# Patient Record
Sex: FEMALE | Race: WHITE | NOT HISPANIC OR LATINO
[De-identification: names, ages, dates, MRNs, and addresses within clinical notes are randomized per-mention and may not be internally consistent; named-entity substitution may affect disease eponyms.]

---

## 2017-05-28 ENCOUNTER — HOSPITAL (OUTPATIENT)
Dept: OTHER | Age: 77
End: 2017-05-28
Attending: FAMILY MEDICINE

## 2018-07-23 ENCOUNTER — HOSPITAL ENCOUNTER (OUTPATIENT)
Dept: ULTRASOUND IMAGING | Age: 78
Discharge: HOME | End: 2018-07-23
Attending: SPECIALIST
Payer: MEDICARE

## 2018-07-23 DIAGNOSIS — C67.9 MALIGNANT NEOPLASM OF URINARY BLADDER, UNSPECIFIED SITE (HCC): ICD-10-CM

## 2018-07-23 PROCEDURE — 76775 US EXAM ABDO BACK WALL LIM: CPT | Performed by: SPECIALIST

## 2018-08-21 ENCOUNTER — LAB ENCOUNTER (OUTPATIENT)
Dept: LAB | Age: 78
End: 2018-08-21
Attending: SPECIALIST
Payer: MEDICARE

## 2018-08-21 DIAGNOSIS — C18.2 COLON CANCER, ASCENDING (HCC): Primary | ICD-10-CM

## 2018-08-21 LAB — CEA SERPL-MCNC: 1.6 NG/ML (ref 0.5–5)

## 2018-08-21 PROCEDURE — 82378 CARCINOEMBRYONIC ANTIGEN: CPT

## 2018-08-21 PROCEDURE — 36415 COLL VENOUS BLD VENIPUNCTURE: CPT

## 2019-08-30 ENCOUNTER — LAB ENCOUNTER (OUTPATIENT)
Dept: LAB | Age: 79
End: 2019-08-30
Attending: SPECIALIST
Payer: MEDICARE

## 2019-08-30 DIAGNOSIS — C67.9 BLADDER CANCER (HCC): Primary | ICD-10-CM

## 2019-08-30 LAB
ANION GAP SERPL CALC-SCNC: 4 MMOL/L (ref 0–18)
BASOPHILS # BLD AUTO: 0.03 X10(3) UL (ref 0–0.2)
BASOPHILS NFR BLD AUTO: 0.5 %
BUN BLD-MCNC: 11 MG/DL (ref 7–18)
BUN/CREAT SERPL: 12.9 (ref 10–20)
CALCIUM BLD-MCNC: 9 MG/DL (ref 8.5–10.1)
CHLORIDE SERPL-SCNC: 106 MMOL/L (ref 98–112)
CO2 SERPL-SCNC: 31 MMOL/L (ref 21–32)
CREAT BLD-MCNC: 0.85 MG/DL (ref 0.55–1.02)
DEPRECATED RDW RBC AUTO: 43.6 FL (ref 35.1–46.3)
EOSINOPHIL # BLD AUTO: 0.06 X10(3) UL (ref 0–0.7)
EOSINOPHIL NFR BLD AUTO: 1 %
ERYTHROCYTE [DISTWIDTH] IN BLOOD BY AUTOMATED COUNT: 13.4 % (ref 11–15)
GLUCOSE BLD-MCNC: 83 MG/DL (ref 70–99)
HCT VFR BLD AUTO: 42 % (ref 35–48)
HGB BLD-MCNC: 13.9 G/DL (ref 12–16)
IMM GRANULOCYTES # BLD AUTO: 0.02 X10(3) UL (ref 0–1)
IMM GRANULOCYTES NFR BLD: 0.3 %
LYMPHOCYTES # BLD AUTO: 1.62 X10(3) UL (ref 1–4)
LYMPHOCYTES NFR BLD AUTO: 26.7 %
MCH RBC QN AUTO: 29.3 PG (ref 26–34)
MCHC RBC AUTO-ENTMCNC: 33.1 G/DL (ref 31–37)
MCV RBC AUTO: 88.4 FL (ref 80–100)
MONOCYTES # BLD AUTO: 0.43 X10(3) UL (ref 0.1–1)
MONOCYTES NFR BLD AUTO: 7.1 %
NEUTROPHILS # BLD AUTO: 3.9 X10 (3) UL (ref 1.5–7.7)
NEUTROPHILS # BLD AUTO: 3.9 X10(3) UL (ref 1.5–7.7)
NEUTROPHILS NFR BLD AUTO: 64.4 %
OSMOLALITY SERPL CALC.SUM OF ELEC: 291 MOSM/KG (ref 275–295)
PLATELET # BLD AUTO: 253 10(3)UL (ref 150–450)
POTASSIUM SERPL-SCNC: 3.9 MMOL/L (ref 3.5–5.1)
RBC # BLD AUTO: 4.75 X10(6)UL (ref 3.8–5.3)
SODIUM SERPL-SCNC: 141 MMOL/L (ref 136–145)
WBC # BLD AUTO: 6.1 X10(3) UL (ref 4–11)

## 2019-08-30 PROCEDURE — 80048 BASIC METABOLIC PNL TOTAL CA: CPT

## 2019-08-30 PROCEDURE — 85025 COMPLETE CBC W/AUTO DIFF WBC: CPT

## 2019-11-05 ENCOUNTER — LAB ENCOUNTER (OUTPATIENT)
Dept: LAB | Age: 79
End: 2019-11-05
Attending: SPECIALIST
Payer: MEDICARE

## 2019-11-05 DIAGNOSIS — C18.9 COLON CANCER (HCC): Primary | ICD-10-CM

## 2019-11-05 PROCEDURE — 80053 COMPREHEN METABOLIC PANEL: CPT

## 2019-11-05 PROCEDURE — 85025 COMPLETE CBC W/AUTO DIFF WBC: CPT

## 2019-11-05 PROCEDURE — 82378 CARCINOEMBRYONIC ANTIGEN: CPT

## 2019-12-10 ENCOUNTER — TELEPHONE (OUTPATIENT)
Dept: FAMILY MEDICINE CLINIC | Facility: CLINIC | Age: 79
End: 2019-12-10

## 2019-12-10 NOTE — LETTER
Alexandria Pace   [address] [place] [id number]           Dear Alexandria Pace     Our records indicate that you have outstanding lab work and or testing that was ordered for you and has not yet been completed:  Lab Frequency Next Occurrence   XR DEXA BONE DENSITOMETRY (CPT=77080) Once 08/31/2023   Basic Metabolic Panel (8) [E] Once 03/06/2024      To provide you with the best possible care, please complete these orders at your earliest convenience. If you have recently completed these orders please disregard this letter.     If you have any questions please call the office at [phone].     Thank you,     [facility]        absent

## 2019-12-24 NOTE — TELEPHONE ENCOUNTER
Left message on voicemail/answering machine for Marlene Samuel to call office regarding [facility] seeing her family member.

## 2020-01-30 NOTE — TELEPHONE ENCOUNTER
[facility] Nell Mcneill Nurse   Caller: Unspecified (Today, 11:09 AM)             TESSY FITZPATRICK IS THE DAUGHTER OF THIS PT.     ADV THAT ERIN AND DR WRIGHT NOT IN OFFICE TODAY!  WILL CALL BACK AND TRICIA Carlton

## 2020-07-16 ENCOUNTER — TELEPHONE (OUTPATIENT)
Dept: FAMILY MEDICINE CLINIC | Facility: CLINIC | Age: 80
End: 2020-07-16

## 2020-07-16 NOTE — TELEPHONE ENCOUNTER
This is the mother of Mirta Huerta [birth year]. She is now living with daughter. Will [facility] accept her as a NP? Also, if she will accept pt is needing to be seen for a right earache. Please call back.

## 2020-07-17 ENCOUNTER — OFFICE VISIT (OUTPATIENT)
Dept: FAMILY MEDICINE CLINIC | Facility: CLINIC | Age: 80
End: 2020-07-17
Payer: MEDICARE

## 2020-07-17 VITALS
OXYGEN SATURATION: 98 % | WEIGHT: 90.63 LBS | RESPIRATION RATE: 15 BRPM | HEART RATE: 110 BPM | TEMPERATURE: 99 F | DIASTOLIC BLOOD PRESSURE: 90 MMHG | SYSTOLIC BLOOD PRESSURE: 142 MMHG

## 2020-07-17 DIAGNOSIS — I10 ESSENTIAL HYPERTENSION: ICD-10-CM

## 2020-07-17 DIAGNOSIS — Z85.038 HISTORY OF COLON CANCER: ICD-10-CM

## 2020-07-17 DIAGNOSIS — C67.9 MALIGNANT NEOPLASM OF URINARY BLADDER, UNSPECIFIED SITE (HCC): ICD-10-CM

## 2020-07-17 DIAGNOSIS — H61.21 IMPACTED CERUMEN OF RIGHT EAR: Primary | ICD-10-CM

## 2020-07-17 PROCEDURE — 99203 OFFICE O/P NEW LOW 30 MIN: CPT | Performed by: FAMILY MEDICINE

## 2020-07-17 RX ORDER — THIAMINE HCL 100 MG
500 TABLET ORAL EVERY MORNING
COMMUNITY

## 2020-07-17 RX ORDER — AMLODIPINE BESYLATE 5 MG/1
5 TABLET ORAL DAILY
COMMUNITY
Start: 2020-05-19 | End: 2020-09-02

## 2020-07-17 NOTE — PROGRESS NOTES
Antolin Hicks is a 67year old female. HPI:   Patient referred by her dtr who is my patient, Iron Gamboa. She reports hx of R ear wax ipaction and she's HATED getting it removed, having her ears messed with gives her panic attacks.   She figures th clubbing or edema    ASSESSMENT AND PLAN:   Diagnoses and all orders for this visit:    Impacted cerumen of right ear  -removed with plastic scoop today, patient tolerated well; instructed on home debrox or hydrogen peroxide a week out of the month if she

## 2020-07-17 NOTE — TELEPHONE ENCOUNTER
Yes that's fine, thanks can do 4:30 today for quick ear visit (this will not be a long new patient estalish visit, just ear)

## 2020-07-24 ENCOUNTER — TELEPHONE (OUTPATIENT)
Dept: FAMILY MEDICINE CLINIC | Facility: CLINIC | Age: 80
End: 2020-07-24

## 2020-07-24 NOTE — TELEPHONE ENCOUNTER
Pt completed medical records request form, 07/17/20, for Dr. Gabi Liz to send pt's medical records from [phone], here to Dr. Leigha tatum. Will fax to Dr. Miguel Leggett office today. Med Rec form is in blue book up front.

## 2020-07-27 NOTE — TELEPHONE ENCOUNTER
Received via fax, medical records requested 7/24/20, medical records requested 7/24/20, from [facility], Dr. Shannon Sanchez. Pulled request from blue book up front, printed scan bar and gave everything to Dr. Telly Burrell.

## 2020-07-28 ENCOUNTER — MED REC SCAN ONLY (OUTPATIENT)
Dept: FAMILY MEDICINE CLINIC | Facility: CLINIC | Age: 80
End: 2020-07-28

## 2020-08-20 ENCOUNTER — TELEPHONE (OUTPATIENT)
Dept: FAMILY MEDICINE CLINIC | Facility: CLINIC | Age: 80
End: 2020-08-20

## 2020-08-20 DIAGNOSIS — I10 ESSENTIAL HYPERTENSION: Primary | ICD-10-CM

## 2020-08-20 DIAGNOSIS — Z85.038 HISTORY OF COLON CANCER: ICD-10-CM

## 2020-08-20 NOTE — TELEPHONE ENCOUNTER
Pt called, pt needs order CEA blood work and pt also wanted to know have we received information/records from Dr. Kristian Spencer from [facility]?   Please call pt at [phone]

## 2020-08-20 NOTE — TELEPHONE ENCOUNTER
She last had the CEA last nov, so can wait a few months (once a year fine). Order placed, thanks    Not sure on the records. I'll check my desk when I back in the office.

## 2020-09-02 RX ORDER — AMLODIPINE BESYLATE 5 MG/1
5 TABLET ORAL DAILY
Qty: 90 TABLET | Refills: 0 | Status: SHIPPED | OUTPATIENT
Start: 2020-09-02 | End: 2020-12-01

## 2020-09-02 NOTE — TELEPHONE ENCOUNTER
Pt called, needs refill on amLODIPine Besylate 5 MG Oral Tab. [facility].    Please call pt at [phone]

## 2020-09-02 NOTE — TELEPHONE ENCOUNTER
LRF- we have not filled this yet for this pt    LOV  7/17/2020  No future appointments.       BP Readings from Last 3 Encounters:  07/17/20 : 142/90        [address] there was a note a couple weeks ago about records from Dr. Talia Purcell and there are rcords scanned i

## 2020-11-24 ENCOUNTER — LAB ENCOUNTER (OUTPATIENT)
Dept: LAB | Age: 80
End: 2020-11-24
Attending: FAMILY MEDICINE
Payer: MEDICARE

## 2020-11-24 DIAGNOSIS — Z85.038 HISTORY OF COLON CANCER: ICD-10-CM

## 2020-11-24 PROCEDURE — 82378 CARCINOEMBRYONIC ANTIGEN: CPT

## 2020-11-24 PROCEDURE — 36415 COLL VENOUS BLD VENIPUNCTURE: CPT

## 2020-12-01 RX ORDER — AMLODIPINE BESYLATE 5 MG/1
5 TABLET ORAL DAILY
Qty: 90 TABLET | Refills: 1 | Status: SHIPPED | OUTPATIENT
Start: 2020-12-01 | End: 2021-02-26

## 2020-12-01 NOTE — TELEPHONE ENCOUNTER
Pt failed refill protocol for the following reasons:      Hypertension Medications Protocol Utthwo7612/01/2020 09:56 AM   CMP or BMP in past 12 months         Last refill: 9- #90 with 0 refills  Last appt: 7-  Next appt: No future appointments.

## 2020-12-03 ENCOUNTER — TELEPHONE (OUTPATIENT)
Dept: FAMILY MEDICINE CLINIC | Facility: CLINIC | Age: 80
End: 2020-12-03

## 2021-02-22 ENCOUNTER — TELEPHONE (OUTPATIENT)
Dept: FAMILY MEDICINE CLINIC | Facility: CLINIC | Age: 81
End: 2021-02-22

## 2021-02-22 NOTE — TELEPHONE ENCOUNTER
Patient advised, she has a physical therapy appointment this afternoon, her daughter drives her, she will coordinate with her and call back.

## 2021-02-22 NOTE — TELEPHONE ENCOUNTER
Pt called she is wanting to know if the dr could send in a muscle relaxer and tylenol with codeine for back pain?        Pt would like sent to   [place] [address], [address] [facility] [phone], [phone]

## 2021-02-22 NOTE — TELEPHONE ENCOUNTER
C/O pain in the left mid back. Denies injury, has had the pain for a couple of weeks saw napropathist/ chiropractor in [facility]. He did some manipulation, didn't really help. Has been seeing him for about 20 years. Some days better than others.  She is delmy

## 2021-02-24 ENCOUNTER — TELEPHONE (OUTPATIENT)
Dept: FAMILY MEDICINE CLINIC | Facility: CLINIC | Age: 81
End: 2021-02-24

## 2021-02-24 ENCOUNTER — MED REC SCAN ONLY (OUTPATIENT)
Dept: FAMILY MEDICINE CLINIC | Facility: CLINIC | Age: 81
End: 2021-02-24

## 2021-02-24 NOTE — TELEPHONE ENCOUNTER
Pt called, is suppose to start therapy on Tuesday and she wanted muscle relaxors and pain killers before she goes. No 30 min slots available. Please call pt at [phone].    Pt only has certain times she can come and we have nothing available at those t

## 2021-02-24 NOTE — TELEPHONE ENCOUNTER
Per , patient is only available after 4pm  Wants appt before next Tuesday    Routed to [facility] to advise

## 2021-02-26 ENCOUNTER — OFFICE VISIT (OUTPATIENT)
Dept: FAMILY MEDICINE CLINIC | Facility: CLINIC | Age: 81
End: 2021-02-26
Payer: MEDICARE

## 2021-02-26 VITALS
WEIGHT: 100.38 LBS | SYSTOLIC BLOOD PRESSURE: 142 MMHG | DIASTOLIC BLOOD PRESSURE: 86 MMHG | OXYGEN SATURATION: 97 % | RESPIRATION RATE: 20 BRPM | TEMPERATURE: 99 F | HEART RATE: 96 BPM

## 2021-02-26 DIAGNOSIS — M54.9 UPPER BACK PAIN: Primary | ICD-10-CM

## 2021-02-26 DIAGNOSIS — R29.898 NECK TIGHTNESS: ICD-10-CM

## 2021-02-26 DIAGNOSIS — I10 ESSENTIAL HYPERTENSION: ICD-10-CM

## 2021-02-26 PROCEDURE — 99214 OFFICE O/P EST MOD 30 MIN: CPT | Performed by: FAMILY MEDICINE

## 2021-02-26 RX ORDER — AMLODIPINE BESYLATE 5 MG/1
5 TABLET ORAL DAILY
Qty: 90 TABLET | Refills: 1 | Status: SHIPPED | OUTPATIENT
Start: 2021-02-26 | End: 2022-01-12

## 2021-02-26 NOTE — PROGRESS NOTES
Rachana Soriano is a 79year old female. HPI:   Patient signed herself up for PHYSICAL THERAPY but also wants to talk medicine options.       She gets occassional backaches for which she'll see her chiro periodically (they do more deep massage type work) REVIEW OF SYSTEMS:   GENERAL HEALTH: feels well otherwise  See HPI    EXAM:   /86   Pulse 96   Temp 98.7 °F (37.1 °C) (Temporal)   Resp 20   Wt 100 lb 6 oz (45.5 kg)   SpO2 97%   GENERAL: well developed, well nourished,in no apparent distress

## 2021-03-02 ENCOUNTER — TELEPHONE (OUTPATIENT)
Dept: FAMILY MEDICINE CLINIC | Facility: CLINIC | Age: 81
End: 2021-03-02

## 2021-03-02 ENCOUNTER — MED REC SCAN ONLY (OUTPATIENT)
Dept: FAMILY MEDICINE CLINIC | Facility: CLINIC | Age: 81
End: 2021-03-02

## 2021-03-05 DIAGNOSIS — Z23 NEED FOR VACCINATION: ICD-10-CM

## 2021-04-06 ENCOUNTER — TELEPHONE (OUTPATIENT)
Dept: FAMILY MEDICINE CLINIC | Facility: CLINIC | Age: 81
End: 2021-04-06

## 2021-04-07 ENCOUNTER — MED REC SCAN ONLY (OUTPATIENT)
Dept: FAMILY MEDICINE CLINIC | Facility: CLINIC | Age: 81
End: 2021-04-07

## 2021-04-23 ENCOUNTER — TELEPHONE (OUTPATIENT)
Dept: FAMILY MEDICINE CLINIC | Facility: CLINIC | Age: 81
End: 2021-04-23

## 2021-04-23 NOTE — TELEPHONE ENCOUNTER
Left message for pt to call office to schedule 1st Medicare visit-Welcome to Medicare.    Waiting pt call back

## 2021-05-26 ENCOUNTER — PATIENT OUTREACH (OUTPATIENT)
Dept: FAMILY MEDICINE CLINIC | Facility: CLINIC | Age: 81
End: 2021-05-26

## 2021-06-02 ENCOUNTER — OFFICE VISIT (OUTPATIENT)
Dept: FAMILY MEDICINE CLINIC | Facility: CLINIC | Age: 81
End: 2021-06-02
Payer: MEDICARE

## 2021-06-02 VITALS
OXYGEN SATURATION: 99 % | SYSTOLIC BLOOD PRESSURE: 140 MMHG | WEIGHT: 101.63 LBS | TEMPERATURE: 98 F | HEIGHT: 56 IN | BODY MASS INDEX: 22.86 KG/M2 | DIASTOLIC BLOOD PRESSURE: 82 MMHG | HEART RATE: 90 BPM

## 2021-06-02 DIAGNOSIS — M81.0 AGE-RELATED OSTEOPOROSIS WITHOUT CURRENT PATHOLOGICAL FRACTURE: ICD-10-CM

## 2021-06-02 DIAGNOSIS — Z78.0 POSTMENOPAUSAL ESTROGEN DEFICIENCY: ICD-10-CM

## 2021-06-02 DIAGNOSIS — C67.9 MALIGNANT NEOPLASM OF URINARY BLADDER, UNSPECIFIED SITE (HCC): ICD-10-CM

## 2021-06-02 DIAGNOSIS — I10 ESSENTIAL HYPERTENSION: ICD-10-CM

## 2021-06-02 DIAGNOSIS — Z00.00 ENCOUNTER FOR ANNUAL HEALTH EXAMINATION: Primary | ICD-10-CM

## 2021-06-02 DIAGNOSIS — Z85.038 HISTORY OF COLON CANCER: ICD-10-CM

## 2021-06-02 PROCEDURE — G0439 PPPS, SUBSEQ VISIT: HCPCS | Performed by: FAMILY MEDICINE

## 2021-06-02 NOTE — PROGRESS NOTES
HPI:   Jory Medina is a 79year old female who presents for a Medicare Subsequent Annual Wellness visit (Pt already had Initial Annual Wellness). Patient thinks she has good overall health.   PHYSICAL THERAPY at [facility] went well, feeling much b NOT have a Power of  for [employer] on file in [place]. The patient has this document but we do not have it in [facility], and patient is instructed to get our office a copy of it for scanning into Epic.        She has never smoked tobacco.    CAGE screen file.   SOCIAL HISTORY:   She  reports that she has never smoked. She has never used smokeless tobacco. She reports that she does not drink alcohol and does not use drugs.      REVIEW OF SYSTEMS:   GENERAL: feels well otherwise  Has some itching bi lower le symmetric radial and post tibial   Skin: Skin color, texture, turgor normal, no rashes or lesions; sebaceous cyst mid upper back I express some contents from; bi inner lower ankles mildly red/rough skin   Lymph nodes: Cervical, supraclavicular, and axillar Future  -     CBC WITH DIFFERENTIAL WITH PLATELET; Future  -     COMP METABOLIC PANEL (14); Future  -     LIPID PANEL; Future  -     TSH W REFLEX TO FREE T4; Future  -     VITAMIN D, 25-HYDROXY; Future  -     CEA;  Future    Age-related osteoporosis without Panel  Cholesterol  Lipoprotein (HDL)  Triglycerides Covered every 5 years for all Medicare beneficiaries without apparent signs or symptoms of cardiovascular disease No results found for: CHOLEST, HDL, LDL, LDLD, LDLC, TRIG      Electrocardiogram (EKG) with certain risk factors   -  No recommendations at this time    Tetanus Toxoid Not covered by Medicare Part B unless medically necessary (cut with metal); may be covered with your pharmacy prescription benefits -    Tetanus, Diptheria and Pertusis TD and

## 2021-06-02 NOTE — PATIENT INSTRUCTIONS
Alexandria Pace's SCREENING SCHEDULE   Tests on this list are recommended by your physician but may not be covered, or covered at this frequency, by your insurer. Please check with your insurance carrier before scheduling to verify coverage.    PREVENT Annually if at high risk -  No recommendations at this time    Chlamydia Annually if high risk -  No recommendations at this time   Screening Mammogram    Mammogram     Recommend annually for all female patients aged 36 and older    One baseline mammogram

## 2021-06-03 ENCOUNTER — HOSPITAL ENCOUNTER (OUTPATIENT)
Dept: BONE DENSITY | Age: 81
Discharge: HOME | End: 2021-06-03
Attending: FAMILY MEDICINE
Payer: MEDICARE

## 2021-06-03 DIAGNOSIS — Z78.0 POSTMENOPAUSAL ESTROGEN DEFICIENCY: ICD-10-CM

## 2021-06-03 PROCEDURE — 77080 DXA BONE DENSITY AXIAL: CPT | Performed by: FAMILY MEDICINE

## 2021-07-14 ENCOUNTER — TELEPHONE (OUTPATIENT)
Dept: FAMILY MEDICINE CLINIC | Facility: CLINIC | Age: 81
End: 2021-07-14

## 2021-07-14 RX ORDER — POLYETHYLENE GLYCOL 3350, SODIUM SULFATE ANHYDROUS, SODIUM BICARBONATE, SODIUM CHLORIDE, POTASSIUM CHLORIDE 236; 22.74; 6.74; 5.86; 2.97 G/4L; G/4L; G/4L; G/4L; G/4L
420 POWDER, FOR SOLUTION ORAL ONCE
COMMUNITY
Start: 2021-07-06

## 2021-07-14 RX ORDER — CIPROFLOXACIN 500 MG/1
TABLET, FILM COATED ORAL
COMMUNITY
Start: 2020-06-16

## 2021-07-14 NOTE — TELEPHONE ENCOUNTER
Pt is here asking for a prescription for flavored Go-Lytly. The one that was prescribed was unflavored. Please send to the [facility] in [facility]. Please call pt with questions.

## 2021-07-14 NOTE — TELEPHONE ENCOUNTER
Patient is having a colonoscopy and was advised to call and get medicine from them like she did initially. Patient v/u.

## 2021-09-09 ENCOUNTER — LAB ENCOUNTER (OUTPATIENT)
Dept: LAB | Age: 81
End: 2021-09-09
Attending: FAMILY MEDICINE
Payer: MEDICARE

## 2021-09-09 DIAGNOSIS — I10 ESSENTIAL HYPERTENSION: ICD-10-CM

## 2021-09-09 DIAGNOSIS — Z85.038 HISTORY OF COLON CANCER: ICD-10-CM

## 2021-09-09 DIAGNOSIS — Z00.00 ENCOUNTER FOR ANNUAL HEALTH EXAMINATION: ICD-10-CM

## 2021-09-09 DIAGNOSIS — Z78.0 POSTMENOPAUSAL ESTROGEN DEFICIENCY: ICD-10-CM

## 2021-09-09 DIAGNOSIS — M81.0 AGE-RELATED OSTEOPOROSIS WITHOUT CURRENT PATHOLOGICAL FRACTURE: ICD-10-CM

## 2021-09-09 DIAGNOSIS — C67.9 MALIGNANT NEOPLASM OF URINARY BLADDER, UNSPECIFIED SITE (HCC): ICD-10-CM

## 2021-09-09 LAB
ALBUMIN SERPL-MCNC: 3.6 G/DL (ref 3.4–5)
ALBUMIN/GLOB SERPL: 1 {RATIO} (ref 1–2)
ALP LIVER SERPL-CCNC: 77 U/L
ALT SERPL-CCNC: 17 U/L
ANION GAP SERPL CALC-SCNC: 4 MMOL/L (ref 0–18)
AST SERPL-CCNC: 11 U/L (ref 15–37)
BASOPHILS # BLD AUTO: 0.03 X10(3) UL (ref 0–0.2)
BASOPHILS NFR BLD AUTO: 0.6 %
BILIRUB SERPL-MCNC: 0.5 MG/DL (ref 0.1–2)
BUN BLD-MCNC: 13 MG/DL (ref 7–18)
CALCIUM BLD-MCNC: 9 MG/DL (ref 8.5–10.1)
CEA SERPL-MCNC: 1.7 NG/ML (ref ?–5)
CHLORIDE SERPL-SCNC: 108 MMOL/L (ref 98–112)
CHOLEST SMN-MCNC: 216 MG/DL (ref ?–200)
CO2 SERPL-SCNC: 28 MMOL/L (ref 21–32)
CREAT BLD-MCNC: 0.85 MG/DL
EOSINOPHIL # BLD AUTO: 0.15 X10(3) UL (ref 0–0.7)
EOSINOPHIL NFR BLD AUTO: 3 %
ERYTHROCYTE [DISTWIDTH] IN BLOOD BY AUTOMATED COUNT: 14.7 %
GLOBULIN PLAS-MCNC: 3.5 G/DL (ref 2.8–4.4)
GLUCOSE BLD-MCNC: 84 MG/DL (ref 70–99)
HCT VFR BLD AUTO: 42.3 %
HDLC SERPL-MCNC: 62 MG/DL (ref 40–59)
HGB BLD-MCNC: 13.3 G/DL
IMM GRANULOCYTES # BLD AUTO: 0.01 X10(3) UL (ref 0–1)
IMM GRANULOCYTES NFR BLD: 0.2 %
LDLC SERPL CALC-MCNC: 132 MG/DL (ref ?–100)
LYMPHOCYTES # BLD AUTO: 1.19 X10(3) UL (ref 1–4)
LYMPHOCYTES NFR BLD AUTO: 23.5 %
M PROTEIN MFR SERPL ELPH: 7.1 G/DL (ref 6.4–8.2)
MCH RBC QN AUTO: 28.5 PG (ref 26–34)
MCHC RBC AUTO-ENTMCNC: 31.4 G/DL (ref 31–37)
MCV RBC AUTO: 90.8 FL
MONOCYTES # BLD AUTO: 0.51 X10(3) UL (ref 0.1–1)
MONOCYTES NFR BLD AUTO: 10.1 %
NEUTROPHILS # BLD AUTO: 3.18 X10 (3) UL (ref 1.5–7.7)
NEUTROPHILS # BLD AUTO: 3.18 X10(3) UL (ref 1.5–7.7)
NEUTROPHILS NFR BLD AUTO: 62.6 %
NONHDLC SERPL-MCNC: 154 MG/DL (ref ?–130)
OSMOLALITY SERPL CALC.SUM OF ELEC: 289 MOSM/KG (ref 275–295)
PATIENT FASTING Y/N/NP: YES
PATIENT FASTING Y/N/NP: YES
PLATELET # BLD AUTO: 250 10(3)UL (ref 150–450)
POTASSIUM SERPL-SCNC: 3.7 MMOL/L (ref 3.5–5.1)
RBC # BLD AUTO: 4.66 X10(6)UL
SODIUM SERPL-SCNC: 140 MMOL/L (ref 136–145)
TRIGL SERPL-MCNC: 124 MG/DL (ref 30–149)
TSI SER-ACNC: 1.19 MIU/ML (ref 0.36–3.74)
VIT D+METAB SERPL-MCNC: 47.2 NG/ML (ref 30–100)
VLDLC SERPL CALC-MCNC: 22 MG/DL (ref 0–30)
WBC # BLD AUTO: 5.1 X10(3) UL (ref 4–11)

## 2021-09-09 PROCEDURE — 80053 COMPREHEN METABOLIC PANEL: CPT

## 2021-09-09 PROCEDURE — 80061 LIPID PANEL: CPT

## 2021-09-09 PROCEDURE — 36415 COLL VENOUS BLD VENIPUNCTURE: CPT

## 2021-09-09 PROCEDURE — 85025 COMPLETE CBC W/AUTO DIFF WBC: CPT

## 2021-09-09 PROCEDURE — 84443 ASSAY THYROID STIM HORMONE: CPT

## 2021-09-09 PROCEDURE — 82306 VITAMIN D 25 HYDROXY: CPT

## 2021-09-09 PROCEDURE — 82378 CARCINOEMBRYONIC ANTIGEN: CPT

## 2021-09-17 ENCOUNTER — ORDER TRANSCRIPTION (OUTPATIENT)
Dept: ADMINISTRATIVE | Facility: HOSPITAL | Age: 81
End: 2021-09-17

## 2021-09-17 ENCOUNTER — LAB ENCOUNTER (OUTPATIENT)
Dept: LAB | Age: 81
End: 2021-09-17
Payer: MEDICARE

## 2021-09-17 DIAGNOSIS — Z01.818 PREOP EXAMINATION: ICD-10-CM

## 2021-09-17 DIAGNOSIS — Z11.59 ENCOUNTER FOR SCREENING FOR OTHER VIRAL DISEASES: ICD-10-CM

## 2021-09-17 DIAGNOSIS — Z01.818 PREOP EXAMINATION: Primary | ICD-10-CM

## 2021-09-18 LAB — SARS-COV-2 RNA RESP QL NAA+PROBE: NOT DETECTED

## 2021-10-05 ENCOUNTER — MED REC SCAN ONLY (OUTPATIENT)
Dept: FAMILY MEDICINE CLINIC | Facility: CLINIC | Age: 81
End: 2021-10-05

## 2022-01-11 NOTE — TELEPHONE ENCOUNTER
Last refilled 2/26/21 for #90 with 1 RF  LOV with [facility] 6/2/21  No future appt with pcp  Last CMP 9/9/21  Routed to pcp to advise    Hypertension Medications Protocol Failed 01/11/2022 08:34 AM   Protocol Details  Appointment in past 6 or next 3 months    CMP

## 2022-01-12 RX ORDER — AMLODIPINE BESYLATE 5 MG/1
5 TABLET ORAL DAILY
Qty: 90 TABLET | Refills: 1 | Status: SHIPPED | OUTPATIENT
Start: 2022-01-12

## 2022-04-05 ENCOUNTER — TELEPHONE (OUTPATIENT)
Dept: FAMILY MEDICINE CLINIC | Facility: CLINIC | Age: 82
End: 2022-04-05

## 2022-06-13 ENCOUNTER — OFFICE VISIT (OUTPATIENT)
Dept: FAMILY MEDICINE CLINIC | Facility: CLINIC | Age: 82
End: 2022-06-13
Payer: MEDICARE

## 2022-06-13 VITALS
BODY MASS INDEX: 23.4 KG/M2 | TEMPERATURE: 98 F | WEIGHT: 107 LBS | DIASTOLIC BLOOD PRESSURE: 86 MMHG | HEART RATE: 120 BPM | RESPIRATION RATE: 16 BRPM | OXYGEN SATURATION: 100 % | SYSTOLIC BLOOD PRESSURE: 132 MMHG | HEIGHT: 56.5 IN

## 2022-06-13 DIAGNOSIS — Z00.00 ENCOUNTER FOR ANNUAL HEALTH EXAMINATION: Primary | ICD-10-CM

## 2022-06-13 DIAGNOSIS — M81.0 AGE-RELATED OSTEOPOROSIS WITHOUT CURRENT PATHOLOGICAL FRACTURE: ICD-10-CM

## 2022-06-13 DIAGNOSIS — I10 ESSENTIAL HYPERTENSION: ICD-10-CM

## 2022-06-13 DIAGNOSIS — C67.9 MALIGNANT NEOPLASM OF URINARY BLADDER, UNSPECIFIED SITE (HCC): ICD-10-CM

## 2022-06-13 DIAGNOSIS — L81.9 HYPERPIGMENTED SKIN LESION: ICD-10-CM

## 2022-06-13 DIAGNOSIS — Z85.038 HISTORY OF COLON CANCER: ICD-10-CM

## 2022-06-13 PROCEDURE — G0439 PPPS, SUBSEQ VISIT: HCPCS | Performed by: FAMILY MEDICINE

## 2022-06-13 PROCEDURE — 1125F AMNT PAIN NOTED PAIN PRSNT: CPT | Performed by: FAMILY MEDICINE

## 2022-07-29 ENCOUNTER — MED REC SCAN ONLY (OUTPATIENT)
Dept: FAMILY MEDICINE CLINIC | Facility: CLINIC | Age: 82
End: 2022-07-29

## 2022-08-04 RX ORDER — AMLODIPINE BESYLATE 5 MG/1
TABLET ORAL
Qty: 90 TABLET | Refills: 0 | Status: SHIPPED | OUTPATIENT
Start: 2022-08-04

## 2022-08-04 NOTE — TELEPHONE ENCOUNTER
LOV 06/13/2022  Last labs 09/09/2021  BP Readings from Last 3 Encounters:  06/13/22 : 132/86  06/02/21 : 140/82  02/26/21 : 142/86      Last refill on 01/12/22, for #90 tabs, with 1 refills  amLODIPine 5 MG Oral Tab    Hypertension Medications Protocol Passed 08/04/2022 11:11 AM   Protocol Details  CMP or BMP in past 12 months    Last serum creatinine< 2.0    Appointment in past 6 or next 3 months         No future appointments.     Order per protocol

## 2022-09-08 ENCOUNTER — TELEPHONE (OUTPATIENT)
Dept: FAMILY MEDICINE CLINIC | Facility: CLINIC | Age: 82
End: 2022-09-08

## 2022-09-08 DIAGNOSIS — Z85.038 HISTORY OF COLON CANCER: Primary | ICD-10-CM

## 2022-09-08 NOTE — TELEPHONE ENCOUNTER
Order placed    Left detailed message to voicemail (per verbal release form consent with confirmed identifying message) of note below/above. Patient was advised to call CS# to schedule lab test and/or call office back with any questions/concerns.

## 2022-09-08 NOTE — TELEPHONE ENCOUNTER
PATIENT SCHEDULED HERSELF FOR LAB DRAW AT [facility]. PATIENT WANTING A CEA. PATIENT WAS UNDER THE ASSUMPTION THE ORDER WOULD BE THERE AUTOMATICALLY. [facility] CALL[facility]. PATIENT WAS SENT HOME. DR Minda Flowers HAD WRITTEN THIS ORDER PREVIOUSLY.

## 2022-09-08 NOTE — TELEPHONE ENCOUNTER
LOV 06/13/2022 with Dr. Jojo Obrien for annual health exam    Last CEA lab completed 09/09/2021 - ordered by Dr. Edel Reyes to place CEA lab order? Diagnosis?   Please advise, thank you

## 2022-09-09 ENCOUNTER — LAB ENCOUNTER (OUTPATIENT)
Dept: LAB | Age: 82
End: 2022-09-09
Attending: FAMILY MEDICINE
Payer: MEDICARE

## 2022-09-09 DIAGNOSIS — Z85.038 HISTORY OF COLON CANCER: ICD-10-CM

## 2022-09-09 LAB — CEA SERPL-MCNC: 1.6 NG/ML (ref ?–5)

## 2022-09-09 PROCEDURE — 82378 CARCINOEMBRYONIC ANTIGEN: CPT

## 2022-09-09 PROCEDURE — 36415 COLL VENOUS BLD VENIPUNCTURE: CPT

## 2022-12-27 RX ORDER — AMLODIPINE BESYLATE 5 MG/1
TABLET ORAL
Qty: 90 TABLET | Refills: 0 | Status: SHIPPED | OUTPATIENT
Start: 2022-12-27

## 2022-12-27 NOTE — TELEPHONE ENCOUNTER
Hypertension Medications Protocol Failed 12/27/2022 10:16 AM   Protocol Details  CMP or BMP in past 12 months    Appointment in past 6 or next 3 months    Last serum creatinine< 2.0     LOV: 6/13/22   Last Refill: 8/4/22 90 0 Rf    No future appointments.

## 2023-04-05 ENCOUNTER — MED REC SCAN ONLY (OUTPATIENT)
Dept: FAMILY MEDICINE CLINIC | Facility: CLINIC | Age: 83
End: 2023-04-05

## 2023-05-08 RX ORDER — AMLODIPINE BESYLATE 5 MG/1
TABLET ORAL
Qty: 90 TABLET | Refills: 0 | Status: SHIPPED | OUTPATIENT
Start: 2023-05-08

## 2023-05-08 NOTE — TELEPHONE ENCOUNTER
LOV 06/13/22  BP Readings from Last 3 Encounters:  06/13/22 : 132/86  06/02/21 : 140/82  02/26/21 : 142/86    Last refill on 12/27/22, for #90 tabs, with 0 refills  AMLODIPINE 5 MG Oral Tab  Hypertension Medications Protocol Failed 05/08/2023 10:22 AM   Protocol Details  CMP or BMP in past 12 months    Last serum creatinine< 2.0    Appointment in past 6 or next 3 months     Future Appointments   Date Time Provider Remi Pitt   6/21/2023  1:20 PM Howie Pollard MD [facility] HEIKE Gao     Order(s) pending, please review. Thank you.

## 2023-08-31 ENCOUNTER — OFFICE VISIT (OUTPATIENT)
Dept: FAMILY MEDICINE CLINIC | Facility: CLINIC | Age: 83
End: 2023-08-31
Payer: MEDICARE

## 2023-08-31 VITALS
SYSTOLIC BLOOD PRESSURE: 146 MMHG | DIASTOLIC BLOOD PRESSURE: 80 MMHG | HEIGHT: 55 IN | BODY MASS INDEX: 23.37 KG/M2 | WEIGHT: 101 LBS | TEMPERATURE: 98 F | HEART RATE: 110 BPM | OXYGEN SATURATION: 100 %

## 2023-08-31 DIAGNOSIS — I10 ESSENTIAL HYPERTENSION: ICD-10-CM

## 2023-08-31 DIAGNOSIS — Z85.51 HISTORY OF PRIMARY MALIGNANT NEOPLASM OF URINARY BLADDER: ICD-10-CM

## 2023-08-31 DIAGNOSIS — Z85.038 HISTORY OF COLON CANCER: ICD-10-CM

## 2023-08-31 DIAGNOSIS — M81.0 AGE-RELATED OSTEOPOROSIS WITHOUT CURRENT PATHOLOGICAL FRACTURE: ICD-10-CM

## 2023-08-31 DIAGNOSIS — Z00.00 ENCOUNTER FOR ANNUAL HEALTH EXAMINATION: Primary | ICD-10-CM

## 2023-08-31 PROBLEM — C67.9 MALIGNANT NEOPLASM OF URINARY BLADDER (HCC): Status: RESOLVED | Noted: 2020-07-17 | Resolved: 2023-08-31

## 2023-08-31 LAB
ALBUMIN SERPL-MCNC: 3.9 G/DL (ref 3.4–5)
ALBUMIN/GLOB SERPL: 1.1 {RATIO} (ref 1–2)
ALP LIVER SERPL-CCNC: 99 U/L
ALT SERPL-CCNC: 22 U/L
ANION GAP SERPL CALC-SCNC: 3 MMOL/L (ref 0–18)
AST SERPL-CCNC: 16 U/L (ref 15–37)
BASOPHILS # BLD AUTO: 0.03 X10(3) UL (ref 0–0.2)
BASOPHILS NFR BLD AUTO: 0.4 %
BILIRUB SERPL-MCNC: 0.7 MG/DL (ref 0.1–2)
BUN BLD-MCNC: 16 MG/DL (ref 7–18)
CALCIUM BLD-MCNC: 9.1 MG/DL (ref 8.5–10.1)
CEA SERPL-MCNC: 1.9 NG/ML (ref ?–5)
CHLORIDE SERPL-SCNC: 107 MMOL/L (ref 98–112)
CHOLEST SERPL-MCNC: 222 MG/DL (ref ?–200)
CO2 SERPL-SCNC: 28 MMOL/L (ref 21–32)
CREAT BLD-MCNC: 1.01 MG/DL
EGFRCR SERPLBLD CKD-EPI 2021: 56 ML/MIN/1.73M2 (ref 60–?)
EOSINOPHIL # BLD AUTO: 0.05 X10(3) UL (ref 0–0.7)
EOSINOPHIL NFR BLD AUTO: 0.7 %
ERYTHROCYTE [DISTWIDTH] IN BLOOD BY AUTOMATED COUNT: 14.3 %
FASTING PATIENT LIPID ANSWER: NO
FASTING STATUS PATIENT QL REPORTED: NO
GLOBULIN PLAS-MCNC: 3.7 G/DL (ref 2.8–4.4)
GLUCOSE BLD-MCNC: 89 MG/DL (ref 70–99)
HCT VFR BLD AUTO: 43.1 %
HDLC SERPL-MCNC: 70 MG/DL (ref 40–59)
HGB BLD-MCNC: 14.2 G/DL
IMM GRANULOCYTES # BLD AUTO: 0.03 X10(3) UL (ref 0–1)
IMM GRANULOCYTES NFR BLD: 0.4 %
LDLC SERPL CALC-MCNC: 129 MG/DL (ref ?–100)
LYMPHOCYTES # BLD AUTO: 1.38 X10(3) UL (ref 1–4)
LYMPHOCYTES NFR BLD AUTO: 19.4 %
MCH RBC QN AUTO: 29.1 PG (ref 26–34)
MCHC RBC AUTO-ENTMCNC: 32.9 G/DL (ref 31–37)
MCV RBC AUTO: 88.3 FL
MONOCYTES # BLD AUTO: 0.47 X10(3) UL (ref 0.1–1)
MONOCYTES NFR BLD AUTO: 6.6 %
NEUTROPHILS # BLD AUTO: 5.17 X10 (3) UL (ref 1.5–7.7)
NEUTROPHILS # BLD AUTO: 5.17 X10(3) UL (ref 1.5–7.7)
NEUTROPHILS NFR BLD AUTO: 72.5 %
NONHDLC SERPL-MCNC: 152 MG/DL (ref ?–130)
OSMOLALITY SERPL CALC.SUM OF ELEC: 287 MOSM/KG (ref 275–295)
PLATELET # BLD AUTO: 260 10(3)UL (ref 150–450)
POTASSIUM SERPL-SCNC: 4.1 MMOL/L (ref 3.5–5.1)
PROT SERPL-MCNC: 7.6 G/DL (ref 6.4–8.2)
RBC # BLD AUTO: 4.88 X10(6)UL
SODIUM SERPL-SCNC: 138 MMOL/L (ref 136–145)
TRIGL SERPL-MCNC: 129 MG/DL (ref 30–149)
TSI SER-ACNC: 1.74 MIU/ML (ref 0.36–3.74)
VLDLC SERPL CALC-MCNC: 23 MG/DL (ref 0–30)
WBC # BLD AUTO: 7.1 X10(3) UL (ref 4–11)

## 2023-08-31 PROCEDURE — G0439 PPPS, SUBSEQ VISIT: HCPCS | Performed by: NURSE PRACTITIONER

## 2023-08-31 PROCEDURE — 85025 COMPLETE CBC W/AUTO DIFF WBC: CPT | Performed by: NURSE PRACTITIONER

## 2023-08-31 PROCEDURE — 80061 LIPID PANEL: CPT | Performed by: NURSE PRACTITIONER

## 2023-08-31 PROCEDURE — 84443 ASSAY THYROID STIM HORMONE: CPT | Performed by: NURSE PRACTITIONER

## 2023-08-31 PROCEDURE — 1126F AMNT PAIN NOTED NONE PRSNT: CPT | Performed by: NURSE PRACTITIONER

## 2023-08-31 PROCEDURE — 80053 COMPREHEN METABOLIC PANEL: CPT | Performed by: NURSE PRACTITIONER

## 2023-08-31 PROCEDURE — 82378 CARCINOEMBRYONIC ANTIGEN: CPT | Performed by: NURSE PRACTITIONER

## 2023-09-01 ENCOUNTER — TELEPHONE (OUTPATIENT)
Dept: FAMILY MEDICINE CLINIC | Facility: CLINIC | Age: 83
End: 2023-09-01

## 2023-09-01 DIAGNOSIS — R94.4 DECREASED GFR: Primary | ICD-10-CM

## 2023-09-01 NOTE — TELEPHONE ENCOUNTER
----- Message from MAGGIE Morris sent at 9/1/2023  8:41 AM CDT -----  Chemistries stable, mildly decreased GFR. Continue to monitor, recheck BMP in 6 months  Cholesterol mildly increased but stable from last year. Could consider medication if interested but definitely continue lifestyle modifications. This incorporating nuts, high-fiber foods and fiber supplements, and foods rich in polyphenols (berries, apples, spinach, olives, and flaxseed), which can help reduce LDL and increase HDL.    No anemia  Thyroid function normal

## 2023-09-06 NOTE — TELEPHONE ENCOUNTER
Pt calling - asking what GFR and BMP is    Advised pt - GFR - estimated glomerular filtration rate   blood test that looks for changes in how your kidneys function     Pt asking what can affect this - advised pt per clinical references: Your test results may be affected if you:  Have changing kidney function   Are severely malnourished, are underweight, or have muscle-wasting disease  Are severely overweight  Are a   Have a neuromuscular disorder  Are taking certain medicines, including chemotherapies and kidney medicines  Eat a lot of meat  Eat a vegetarian or low-meat diet  Take creatine supplements  Are pregnant  Have health conditions that affect the kidneys\      Advised pt BMP is basic metabolic panel - includes kidney function in test - she v/u    Pt asking if she is taking too many supplements? Advised pt that APRN stated \"mildly decreased\" - no mention of concerns to advise stopping any supplements - plan to recheck in 6 months for improvement or not - she v/u    Pt asking CEA number - advised pt CEA 1.9  Pt v/u.  No further questions at this time    Recall placed - BMP in 6 months  Order placed

## 2023-09-07 RX ORDER — AMLODIPINE BESYLATE 5 MG/1
TABLET ORAL
Qty: 90 TABLET | Refills: 0 | Status: SHIPPED | OUTPATIENT
Start: 2023-09-07

## 2023-09-07 NOTE — TELEPHONE ENCOUNTER
Hypertension Medications Protocol Soayga0809/07/2023 11:00 AM   Protocol Details CMP or BMP in past 12 months    Last serum creatinine< 2.0    Appointment in past 6 or next 3 months     LOV 8/31/23    No future appointments.     Last refill 5/8/23 90 days with 0 refills     Last lab 8/31/23    Medication past protocol    Medication sent

## 2023-11-15 PROBLEM — Z85.51 H/O PRIMARY MALIGNANT NEOPLASM OF URINARY BLADDER: Status: ACTIVE | Noted: 2023-11-15

## 2023-11-16 ENCOUNTER — MED REC SCAN ONLY (OUTPATIENT)
Dept: FAMILY MEDICINE CLINIC | Facility: CLINIC | Age: 83
End: 2023-11-16

## 2024-01-17 RX ORDER — AMLODIPINE BESYLATE 5 MG/1
TABLET ORAL
Qty: 90 TABLET | Refills: 0 | Status: SHIPPED | OUTPATIENT
Start: 2024-01-17

## 2024-01-17 NOTE — TELEPHONE ENCOUNTER
Hypertension Medications Protocol Ukkhnz6601/16/2024 04:36 PM   Protocol Details CMP or BMP in past 12 months    Last serum creatinine< 2.0    Appointment in past 6 or next 3 months

## 2024-03-15 ENCOUNTER — HOSPITAL ENCOUNTER (OUTPATIENT)
Dept: GENERAL RADIOLOGY | Age: 84
Discharge: HOME | End: 2024-03-15
Attending: NURSE PRACTITIONER
Payer: MEDICARE

## 2024-03-15 ENCOUNTER — OFFICE VISIT (OUTPATIENT)
Dept: FAMILY MEDICINE CLINIC | Facility: CLINIC | Age: 84
End: 2024-03-15
Payer: MEDICARE

## 2024-03-15 VITALS
HEIGHT: 55 IN | OXYGEN SATURATION: 98 % | BODY MASS INDEX: 23.14 KG/M2 | HEART RATE: 95 BPM | DIASTOLIC BLOOD PRESSURE: 80 MMHG | SYSTOLIC BLOOD PRESSURE: 116 MMHG | RESPIRATION RATE: 18 BRPM | TEMPERATURE: 97 F | WEIGHT: 100 LBS

## 2024-03-15 DIAGNOSIS — M79.644 PAIN OF RIGHT THUMB: ICD-10-CM

## 2024-03-15 DIAGNOSIS — M79.644 PAIN OF RIGHT THUMB: Primary | ICD-10-CM

## 2024-03-15 PROCEDURE — 73140 X-RAY EXAM OF FINGER(S): CPT | Performed by: NURSE PRACTITIONER

## 2024-03-15 PROCEDURE — G2211 COMPLEX E/M VISIT ADD ON: HCPCS | Performed by: NURSE PRACTITIONER

## 2024-03-15 PROCEDURE — 99213 OFFICE O/P EST LOW 20 MIN: CPT | Performed by: NURSE PRACTITIONER

## 2024-03-15 NOTE — PROGRESS NOTES
HPI:     Patient is here for right thumb pain. Started about 5 weeks ago after organizing her closet. Has history of trigger finger in another finger. No major injury but more overuse. Has tried occasional aspirin. Stiffness in the morning. Slowly improving. Would an xray today. Would also like to go to PT/OT    Current Outpatient Medications   Medication Sig Dispense Refill    AMLODIPINE 5 MG Oral Tab Take 1 tablet by mouth once daily 90 tablet 0    Multiple Vitamin (MULTI-VITAMIN) Oral Tab Take 1 tablet by mouth every morning.      Cyanocobalamin (VITAMIN B-12) 2500 MCG Sublingual SL Tab Take 500 mcg by mouth every morning.      LUTEIN OR Take 1 tablet by mouth every morning.      Multiple Vitamins-Minerals (HAIR SKIN AND NAILS FORMULA OR) Take 1 tablet by mouth every morning.      Cranberry 1000 MG Oral Cap Take 1 tablet by mouth every morning.        Past Medical History:   Diagnosis Date    Arthritis     Bladder cancer (HCC)     Colon cancer (HCC)     Essential hypertension     Malignant neoplasm of urinary bladder (HCC) 07/17/2020 2012, 2013, 2014, 2019 (treated with bcg bladder infusions)-urologist Dr. Meng [place]      Past Surgical History:   Procedure Laterality Date    HERNIA SURGERY      OTHER SURGICAL HISTORY      colectomy      History reviewed. No pertinent family history.   Social History     Socioeconomic History    Marital status:    Tobacco Use    Smoking status: Never    Smokeless tobacco: Never   Vaping Use    Vaping Use: Never used   Substance and Sexual Activity    Alcohol use: Never    Drug use: Never         REVIEW OF SYSTEMS:   Review of Systems   Musculoskeletal:         Finger pain, swelling   Skin:  Negative for rash.       EXAM:   /80   Pulse 95   Temp 97.4 °F (36.3 °C) (Temporal)   Resp 18   Ht 4' 7\" (1.397 m)   Wt 100 lb (45.4 kg)   SpO2 98%   BMI 23.24 kg/m²   Physical Exam  Constitutional:       General: She is not in acute distress.      Appearance: Normal appearance.   Musculoskeletal:        Arms:    Skin:     General: Skin is warm and dry.   Neurological:      Mental Status: She is alert.         ASSESSMENT AND PLAN:   Diagnoses and all orders for this visit:    Pain of right thumb  -     XR FINGER(S) (MIN 2 VIEWS), RIGHT THUMB (CPT=73140); Future  -     Physical Therapy Referral - External    Xray today  Start PT and will evaluate if OT needed  Can continue PRN NSAID  Also ice

## 2024-03-16 ENCOUNTER — TELEPHONE (OUTPATIENT)
Dept: FAMILY MEDICINE CLINIC | Facility: CLINIC | Age: 84
End: 2024-03-16

## 2024-03-16 NOTE — TELEPHONE ENCOUNTER
----- Message from MAGGIE Bragg sent at 3/15/2024  4:42 PM CDT -----  Evidence of osteopenia, which isn't surprising since Dexa in 2021 showed osteoporosis. She does have Dexa on file, should consider repeating if she would be interested in treatment  No acute fractures or dislocation. Start PT  If pain persists, would recommend hand ortho specialist

## 2024-03-22 ENCOUNTER — TELEPHONE (OUTPATIENT)
Dept: FAMILY MEDICINE CLINIC | Facility: CLINIC | Age: 84
End: 2024-03-22

## 2024-03-22 DIAGNOSIS — M79.644 PAIN OF RIGHT THUMB: Primary | ICD-10-CM

## 2024-03-22 NOTE — TELEPHONE ENCOUNTER
Received call [facility] - needs order for occupation therapy vs PT  The wrist and hand specialist only does occupational therapy - okay to change order?  Fax: [phone]

## 2024-04-08 ENCOUNTER — TELEPHONE (OUTPATIENT)
Dept: FAMILY MEDICINE CLINIC | Facility: CLINIC | Age: 84
End: 2024-04-08

## 2024-04-08 ENCOUNTER — OFFICE VISIT (OUTPATIENT)
Dept: FAMILY MEDICINE CLINIC | Facility: CLINIC | Age: 84
End: 2024-04-08
Payer: MEDICARE

## 2024-04-08 VITALS
BODY MASS INDEX: 24 KG/M2 | TEMPERATURE: 97 F | HEART RATE: 77 BPM | SYSTOLIC BLOOD PRESSURE: 142 MMHG | RESPIRATION RATE: 18 BRPM | OXYGEN SATURATION: 99 % | WEIGHT: 105 LBS | DIASTOLIC BLOOD PRESSURE: 88 MMHG

## 2024-04-08 DIAGNOSIS — M79.89 SWELLING OF RIGHT HAND: Primary | ICD-10-CM

## 2024-04-08 PROCEDURE — 99213 OFFICE O/P EST LOW 20 MIN: CPT | Performed by: FAMILY MEDICINE

## 2024-04-08 RX ORDER — PREDNISONE 20 MG/1
50 TABLET ORAL DAILY
Qty: 13 TABLET | Refills: 0 | Status: SHIPPED | OUTPATIENT
Start: 2024-04-08 | End: 2024-04-13

## 2024-04-08 NOTE — PROGRESS NOTES
Alexandria Pace is a 83 year old female.   Chief Complaint   Patient presents with    Swelling     Right hand      HPI:    83-year-old female who comes in secondary to having swelling on her right hand.  Patient states that since January she has been working with physical therapy for her right hand secondary to having strength and movement issues.  Patient has been using a special brace as well has been doing physical therapy for her hand.  Patient noticed that this morning her hand was slightly swollen for which she comes in to have it evaluated.  Patient denies any new trauma falls patient has not taken any new medications either.  Past Medical History:   Diagnosis Date    Arthritis     Bladder cancer (HCC)     Colon cancer (HCC)     Essential hypertension     Malignant neoplasm of urinary bladder (HCC) 07/17/2020 2012, 2013, 2014, 2019 (treated with bcg bladder infusions)-urologist Dr. Meng Stephens [place]     Past Surgical History:   Procedure Laterality Date    HERNIA SURGERY      OTHER SURGICAL HISTORY      colectomy     No family history on file.  Social History:  Social History     Socioeconomic History    Marital status:    Tobacco Use    Smoking status: Never    Smokeless tobacco: Never   Vaping Use    Vaping Use: Never used   Substance and Sexual Activity    Alcohol use: Never    Drug use: Never     Allergies:  Allergies   Allergen Reactions    Fluorouracil UNKNOWN    Lorazepam UNKNOWN    Sulfamethoxazole W/Trimethoprim UNKNOWN    Erythromycin OTHER (SEE COMMENTS)    Lactose OTHER (SEE COMMENTS)     Intolerant      Current Meds:  Current Outpatient Medications   Medication Sig Dispense Refill    Turmeric (QC TUMERIC COMPLEX OR) Take by mouth.      predniSONE 20 MG Oral Tab Take 2.5 tablets (50 mg total) by mouth daily for 5 days. 13 tablet 0    AMLODIPINE 5 MG Oral Tab Take 1 tablet by mouth once daily 90 tablet 0    Multiple Vitamin (MULTI-VITAMIN) Oral Tab Take 1 tablet by mouth every  morning.      Cyanocobalamin (VITAMIN B-12) 2500 MCG Sublingual SL Tab Take 500 mcg by mouth every morning.      LUTEIN OR Take 1 tablet by mouth every morning.      Cranberry 1000 MG Oral Cap Take 1 tablet by mouth every morning.      Multiple Vitamins-Minerals (HAIR SKIN AND NAILS FORMULA OR) Take 1 tablet by mouth every morning. (Patient not taking: Reported on 4/8/2024)          ROS:   GENERAL HEALTH: feels well otherwise  SKIN: denies any unusual skin lesions or rashes  RESPIRATORY: denies shortness of breath with exertion  CARDIOVASCULAR: denies chest pain on exertion  GI: denies abdominal pain and denies heartburn  NEURO: denies headaches    PHYSICAL EXAM:   /88 (BP Location: Left arm, Patient Position: Sitting, Cuff Size: adult)   Pulse 77   Temp 97.3 °F (36.3 °C) (Temporal)   Resp 18   Wt 105 lb (47.6 kg)   SpO2 99%   BMI 24.40 kg/m²   GENERAL HEALTH: well developed, well nourished, in no apparent distress  EYES: sclera anicteric, conjunctiva normal  HEENT: normocephalic; normal pharynx  NECK: supple; no JVD, no LAD  RESPIRATORY: clear to auscultation bilaterally, no tachypnea  CARDIOVASCULAR: S1, S2 normal, no S3, no S4; no click; no murmur  EXTREMITIES: Right hand slight edema on the dorsum with significantly decreased strength   PSYCHIATRIC: alert and oriented x 3; affect appropriate      ASSESSMENT/ PLAN:     Diagnoses and all orders for this visit:    Swelling of right hand  -     XR HAND (MIN 3 VIEWS), RIGHT (CPT=73130); Future    Other orders  -     predniSONE 20 MG Oral Tab; Take 2.5 tablets (50 mg total) by mouth daily for 5 days.    Swelling and decrease in strength is pretty obvious on examination.  Will get rid of the current inflammatory issue with prednisone daily for the next 5 days and to continue with her current therapy.  May consider an x-ray if swelling does not get better with a prednisone.  Any further follow-up with her PCP in [place]    The patient is to return to office  in prn  The patient is to return to office for persistent or worsening signs and symptoms.   The proper use of medication and possible side effects discussed with patient.  An AVS was given to patient.  The patient verbalized understanding, agrees to treatment regimen and all questions were answered.

## 2024-04-08 NOTE — TELEPHONE ENCOUNTER
Lab Frequency Next Occurrence   XR DEXA BONE DENSITOMETRY (CPT=77080) Once 08/31/2023   Basic Metabolic Panel (8) [E] Once 03/06/2024     Letter mailed to patient reminding them they have outstanding orders.

## 2024-07-09 ENCOUNTER — PATIENT OUTREACH (OUTPATIENT)
Dept: FAMILY MEDICINE CLINIC | Facility: CLINIC | Age: 84
End: 2024-07-09

## 2024-09-04 NOTE — TELEPHONE ENCOUNTER
Hypertension Medications Protocol Hcflej5609/04/2024 11:27 AM   Protocol Details CMP or BMP in past 12 months    Last BP reading less than 140/90    EGFRCR or GFRNAA > 50    In person appointment or virtual visit in the past 12 mos or appointment in next 3      Last office visit 4/8/24  Last refilled on 1/17/24 for # 90 with 0 refills  No future appointments.   Due for physical and labs    MLODIPINE 5MG TAB04/24/2024590 Melvi Dunbar, [facility] [id number] ...

## 2024-09-06 NOTE — TELEPHONE ENCOUNTER
Please schedule medicare annual  We can bridge medication if patient will be out prior to appointment

## 2024-09-07 RX ORDER — AMLODIPINE BESYLATE 5 MG/1
TABLET ORAL
Qty: 30 TABLET | Refills: 0 | Status: SHIPPED | OUTPATIENT
Start: 2024-09-07 | End: 2024-10-30

## 2024-09-07 NOTE — TELEPHONE ENCOUNTER
Medication Quantity Refills Start End   AMLODIPINE 5 MG Oral Tab 90 tablet 0 1/17/2024 --   Sig:   Take 1 tablet by mouth once daily       Last labs 08/31/2023  Last seen on 08/31/2023. /80  No future appointments.     Hypertension Medications Protocol Pvnkvo2609/07/2024 10:30 AM   Protocol Details CMP or BMP in past 12 months    Last BP reading less than 140/90    EGFRCR or GFRNAA > 50     Refill pended  Thank you.

## 2024-09-26 ENCOUNTER — PATIENT OUTREACH (OUTPATIENT)
Dept: FAMILY MEDICINE CLINIC | Facility: CLINIC | Age: 84
End: 2024-09-26

## 2024-10-17 ENCOUNTER — OFFICE VISIT (OUTPATIENT)
Dept: FAMILY MEDICINE CLINIC | Facility: CLINIC | Age: 84
End: 2024-10-17
Payer: MEDICARE

## 2024-10-17 VITALS
HEIGHT: 55 IN | TEMPERATURE: 97 F | DIASTOLIC BLOOD PRESSURE: 82 MMHG | HEART RATE: 106 BPM | RESPIRATION RATE: 18 BRPM | SYSTOLIC BLOOD PRESSURE: 138 MMHG | OXYGEN SATURATION: 99 % | BODY MASS INDEX: 23.65 KG/M2 | WEIGHT: 102.19 LBS

## 2024-10-17 DIAGNOSIS — Z85.51 HISTORY OF PRIMARY MALIGNANT NEOPLASM OF URINARY BLADDER: ICD-10-CM

## 2024-10-17 DIAGNOSIS — R94.4 DECREASED GFR: ICD-10-CM

## 2024-10-17 DIAGNOSIS — G47.00 INSOMNIA, UNSPECIFIED TYPE: ICD-10-CM

## 2024-10-17 DIAGNOSIS — Z28.21 VACCINATION DECLINED: ICD-10-CM

## 2024-10-17 DIAGNOSIS — M81.0 AGE-RELATED OSTEOPOROSIS WITHOUT CURRENT PATHOLOGICAL FRACTURE: ICD-10-CM

## 2024-10-17 DIAGNOSIS — Z00.00 ENCOUNTER FOR ANNUAL HEALTH EXAMINATION: Primary | ICD-10-CM

## 2024-10-17 DIAGNOSIS — I10 ESSENTIAL HYPERTENSION: ICD-10-CM

## 2024-10-17 DIAGNOSIS — Z85.038 HISTORY OF COLON CANCER: ICD-10-CM

## 2024-10-17 RX ORDER — CHLORHEXIDINE GLUCONATE ORAL RINSE 1.2 MG/ML
SOLUTION DENTAL
COMMUNITY
Start: 2024-04-23

## 2024-10-17 NOTE — PROGRESS NOTES
Subjective:   Alexandria Pace is a 84 year old female who presents for a Medicare Subsequent Annual Wellness visit (Pt already had Initial Annual Wellness) and scheduled follow up of multiple significant but stable problems.   Trouble sleeping. Admits trouble both falling asleep and staying asleep. Feels like could be stress related. She has never tried an over the counter sleep supplement with no relief. Admits watching TV immediately before going to sleep. Admits getting an average of 6 hours in total of sleep per night.       History/Other:   Fall Risk Assessment:   She has been screened for Falls and is low risk.      Cognitive Assessment:   She had a completely normal cognitive assessment - see flowsheet entries       Functional Ability/Status:   Alexandria Pace has some abnormal functions as listed below:  She has Hearing problems based on screening of functional status.She has Vision problems based on screening of functional status.       Depression Screening (PHQ):  PHQ-2 SCORE: 0  , done 10/17/2024       Advanced Directives:   She does have a Living Will but we do NOT have it on file in Epic.    She does have a POA but we do NOT have it on file in Epic.      Patient Active Problem List   Diagnosis    Essential hypertension    History of colon cancer    Age-related osteoporosis without current pathological fracture    H/O primary malignant neoplasm of urinary bladder    History of primary malignant neoplasm of urinary bladder    Insomnia    Decreased GFR     Allergies:  She is allergic to fluorouracil, lorazepam, sulfamethoxazole w/trimethoprim, erythromycin, and lactose.    Current Medications:  Outpatient Medications Marked as Taking for the 10/17/24 encounter (Office Visit) with Melvi Dorman APRN   Medication Sig    chlorhexidine gluconate 0.12 % Mouth/Throat Solution RINSE FOR 30 SECONDS AND SPIT, USE ONCE DAILY AFTER BRUSHING .(BRUSH INTO IRRITATED GUMS) NOTHING BY MOUTH FOR 30 MINUTES AFTER  USING    amLODIPine 5 MG Oral Tab Take 1 tablet by mouth once daily    Turmeric (QC TUMERIC COMPLEX OR) Take by mouth.    Multiple Vitamin (MULTI-VITAMIN) Oral Tab Take 1 tablet by mouth every morning.    Cyanocobalamin (VITAMIN B-12) 2500 MCG Sublingual SL Tab Take 500 mcg by mouth every morning.    LUTEIN OR Take 1 tablet by mouth every morning.    Cranberry 1000 MG Oral Cap Take 1 tablet by mouth every morning.       Medical History:  She  has a past medical history of Arthritis, Bladder cancer (HCC), Colon cancer (HCC), Essential hypertension, and Malignant neoplasm of urinary bladder (HCC) (07/17/2020).  Surgical History:  She  has a past surgical history that includes hernia surgery and other surgical history.   Family History:  Her family history is not on file.  Social History:  She  reports that she has never smoked. She has never used smokeless tobacco. She reports that she does not drink alcohol and does not use drugs.    Tobacco:  She has never smoked tobacco.    CAGE Alcohol Screen:   CAGE screening score of 0 on 10/17/2024, showing low risk of alcohol abuse.      Patient Care Team:  Sylwia Longoria MD as PCP - General (Family Medicine)    Review of Systems   Constitutional:  Negative for activity change, fatigue and fever.   HENT:  Negative for congestion, rhinorrhea and sore throat.    Respiratory:  Negative for chest tightness and shortness of breath.    Cardiovascular:  Negative for chest pain and palpitations.   Gastrointestinal:  Negative for abdominal pain, blood in stool, constipation, diarrhea, nausea and vomiting.   Neurological:  Negative for dizziness and light-headedness.       Objective:   Physical Exam  Constitutional:       General: She is not in acute distress.     Appearance: Normal appearance. She is normal weight. She is not ill-appearing.   HENT:      Head: Normocephalic and atraumatic.      Right Ear: Tympanic membrane, ear canal and external ear normal. There is impacted cerumen.       Left Ear: Tympanic membrane, ear canal and external ear normal. There is impacted cerumen.      Nose: Nose normal. No congestion or rhinorrhea.      Mouth/Throat:      Mouth: Mucous membranes are moist.      Pharynx: Oropharynx is clear. Posterior oropharyngeal erythema present.   Eyes:      General: No scleral icterus.        Right eye: No discharge.         Left eye: No discharge.      Conjunctiva/sclera: Conjunctivae normal.      Pupils: Pupils are equal, round, and reactive to light.   Cardiovascular:      Rate and Rhythm: Regular rhythm. Tachycardia present.      Pulses: Normal pulses.      Heart sounds: Normal heart sounds.   Pulmonary:      Effort: Pulmonary effort is normal. No respiratory distress.      Breath sounds: Normal breath sounds.   Skin:     General: Skin is warm.      Capillary Refill: Capillary refill takes less than 2 seconds.   Neurological:      General: No focal deficit present.      Mental Status: She is alert and oriented to person, place, and time.   Psychiatric:         Mood and Affect: Mood normal.         Behavior: Behavior normal.         Thought Content: Thought content normal.         Judgment: Judgment normal.         /82 (BP Location: Right arm, Patient Position: Sitting, Cuff Size: adult)   Pulse 106   Temp 97.3 °F (36.3 °C) (Temporal)   Resp 18   Ht 4' 7\" (1.397 m)   Wt 102 lb 3.2 oz (46.4 kg)   SpO2 99%   BMI 23.75 kg/m²  Estimated body mass index is 23.75 kg/m² as calculated from the following:    Height as of this encounter: 4' 7\" (1.397 m).    Weight as of this encounter: 102 lb 3.2 oz (46.4 kg).    Medicare Hearing Assessment:   Hearing Screening    Time taken: 10/17/2024 12:48 PM  Screening Method: Finger Rub  Finger Rub Result: Pass         Visual Acuity:   Right Eye Visual Acuity: Corrected Right Eye Chart Acuity: 20/50   Left Eye Visual Acuity: Corrected Left Eye Chart Acuity: 20/50   Both Eyes Visual Acuity: Corrected Both Eyes Chart Acuity: 20/50    Able To Tolerate Visual Acuity: Yes        Assessment & Plan:   Alexandria Pace is a 84 year old female who presents for a Medicare Assessment.     1. Encounter for annual health examination (Primary)  Comments:  Return for fasting labs  Orders:  -     TSH W Reflex To Free T4; Future; Expected date: 10/17/2024  -     Comp Metabolic Panel (14); Future; Expected date: 10/17/2024  -     Lipid Panel; Future; Expected date: 10/17/2024  -     CBC With Differential With Platelet; Future; Expected date: 10/17/2024  2. Essential hypertension  Comments:  BP is goal, continue current medication  Orders:  -     Comp Metabolic Panel (14); Future; Expected date: 10/17/2024  -     Lipid Panel; Future; Expected date: 10/17/2024  3. Decreased GFR  Comments:  will return for lab work  Overview:  will return for lab work  Orders:  -     Comp Metabolic Panel (14); Future; Expected date: 10/17/2024  4. Vaccination declined  5. Age-related osteoporosis without current pathological fracture  6. History of colon cancer  Comments:  checking annual CEA, will return for labs  Overview:  ROMULO--colonoscopy [id number]    Orders:  -     CEA; Future; Expected date: 10/17/2024  7. History of primary malignant neoplasm of urinary bladder  Comments:  Following with urology  8. Insomnia, unspecified type  Comments:  Discussed healthy sleep habits  The patient indicates understanding of these issues and agrees to the plan.  Lab work ordered.  Reinforced healthy diet, lifestyle, and exercise.      Return in 6 months (on 4/17/2025).     Melvi Dorman, MAGGIE, 10/17/2024     Supplementary Documentation:   General Health:  In the past six months, have you lost more than 10 pounds without trying?: 2 - No  Has your appetite been poor?: No  Type of Diet: Balanced  How does the patient maintain a good energy level?: Other (vitamins)  How would you describe your daily physical activity?: Moderate  How would you describe your current health state?: Good  How  do you maintain positive mental well-being?: Social Interaction;Visiting Family;Visiting Friends  On a scale of 0 to 10, with 0 being no pain and 10 being severe pain, what is your pain level?: 5 - (Moderate)  In the past six months, have you experienced urine leakage?: 1-Yes  At any time do you feel concerned for the safety/well-being of yourself and/or your children, in your home or elsewhere?: No  Have you had any immunizations at another office such as Influenza, Hepatitis B, Tetanus, or Pneumococcal?: No    Health Maintenance   Topic Date Due    Zoster Vaccines (1 of 2) Never done    Pneumococcal Vaccine: 65+ Years (1 of 1 - PCV) Never done    Fall Risk Screening (Annual)  01/01/2024    HTN: BP Follow-Up  05/08/2024    Annual Physical  08/31/2024    COVID-19 Vaccine (1 - 2023-24 season) Never done    Influenza Vaccine (1) Never done    DEXA Scan  Completed    Annual Depression Screening  Completed     Initial evaluation performed by CASTRO Luis    Patient discussed with student. Independent exam performed. Agree with assessment and plan.

## 2024-10-21 ENCOUNTER — LAB ENCOUNTER (OUTPATIENT)
Dept: LAB | Age: 84
End: 2024-10-21
Attending: NURSE PRACTITIONER
Payer: MEDICARE

## 2024-10-21 DIAGNOSIS — Z85.038 HISTORY OF COLON CANCER: ICD-10-CM

## 2024-10-21 DIAGNOSIS — Z00.00 ENCOUNTER FOR ANNUAL HEALTH EXAMINATION: ICD-10-CM

## 2024-10-21 DIAGNOSIS — R94.4 DECREASED GFR: ICD-10-CM

## 2024-10-21 DIAGNOSIS — I10 ESSENTIAL HYPERTENSION: ICD-10-CM

## 2024-10-21 LAB
ALBUMIN SERPL-MCNC: 4.8 G/DL (ref 3.2–4.8)
ALBUMIN/GLOB SERPL: 1.8 {RATIO} (ref 1–2)
ALP LIVER SERPL-CCNC: 97 U/L
ALT SERPL-CCNC: 23 U/L
ANION GAP SERPL CALC-SCNC: 6 MMOL/L (ref 0–18)
AST SERPL-CCNC: 27 U/L (ref ?–34)
BASOPHILS # BLD AUTO: 0.03 X10(3) UL (ref 0–0.2)
BASOPHILS NFR BLD AUTO: 0.5 %
BILIRUB SERPL-MCNC: 0.8 MG/DL (ref 0.2–1.1)
BUN BLD-MCNC: 17 MG/DL (ref 9–23)
CALCIUM BLD-MCNC: 10.1 MG/DL (ref 8.7–10.4)
CEA SERPL-MCNC: 1.4 NG/ML (ref ?–5)
CHLORIDE SERPL-SCNC: 108 MMOL/L (ref 98–112)
CHOLEST SERPL-MCNC: 218 MG/DL (ref ?–200)
CO2 SERPL-SCNC: 27 MMOL/L (ref 21–32)
CREAT BLD-MCNC: 1.05 MG/DL
EGFRCR SERPLBLD CKD-EPI 2021: 53 ML/MIN/1.73M2 (ref 60–?)
EOSINOPHIL # BLD AUTO: 0.11 X10(3) UL (ref 0–0.7)
EOSINOPHIL NFR BLD AUTO: 1.8 %
ERYTHROCYTE [DISTWIDTH] IN BLOOD BY AUTOMATED COUNT: 14.1 %
FASTING PATIENT LIPID ANSWER: YES
FASTING STATUS PATIENT QL REPORTED: YES
GLOBULIN PLAS-MCNC: 2.6 G/DL (ref 2–3.5)
GLUCOSE BLD-MCNC: 87 MG/DL (ref 70–99)
HCT VFR BLD AUTO: 42 %
HDLC SERPL-MCNC: 70 MG/DL (ref 40–59)
HGB BLD-MCNC: 14.3 G/DL
IMM GRANULOCYTES # BLD AUTO: 0.01 X10(3) UL (ref 0–1)
IMM GRANULOCYTES NFR BLD: 0.2 %
LDLC SERPL CALC-MCNC: 129 MG/DL (ref ?–100)
LYMPHOCYTES # BLD AUTO: 1.45 X10(3) UL (ref 1–4)
LYMPHOCYTES NFR BLD AUTO: 23.2 %
MCH RBC QN AUTO: 30 PG (ref 26–34)
MCHC RBC AUTO-ENTMCNC: 34 G/DL (ref 31–37)
MCV RBC AUTO: 88.2 FL
MONOCYTES # BLD AUTO: 0.47 X10(3) UL (ref 0.1–1)
MONOCYTES NFR BLD AUTO: 7.5 %
NEUTROPHILS # BLD AUTO: 4.19 X10 (3) UL (ref 1.5–7.7)
NEUTROPHILS # BLD AUTO: 4.19 X10(3) UL (ref 1.5–7.7)
NEUTROPHILS NFR BLD AUTO: 66.8 %
NONHDLC SERPL-MCNC: 148 MG/DL (ref ?–130)
OSMOLALITY SERPL CALC.SUM OF ELEC: 293 MOSM/KG (ref 275–295)
PLATELET # BLD AUTO: 235 10(3)UL (ref 150–450)
POTASSIUM SERPL-SCNC: 4.5 MMOL/L (ref 3.5–5.1)
PROT SERPL-MCNC: 7.4 G/DL (ref 5.7–8.2)
RBC # BLD AUTO: 4.76 X10(6)UL
SODIUM SERPL-SCNC: 141 MMOL/L (ref 136–145)
TRIGL SERPL-MCNC: 107 MG/DL (ref 30–149)
TSI SER-ACNC: 1.86 MIU/ML (ref 0.55–4.78)
VLDLC SERPL CALC-MCNC: 19 MG/DL (ref 0–30)
WBC # BLD AUTO: 6.3 X10(3) UL (ref 4–11)

## 2024-10-21 PROCEDURE — 82378 CARCINOEMBRYONIC ANTIGEN: CPT

## 2024-10-21 PROCEDURE — 84443 ASSAY THYROID STIM HORMONE: CPT

## 2024-10-21 PROCEDURE — 85025 COMPLETE CBC W/AUTO DIFF WBC: CPT

## 2024-10-21 PROCEDURE — 80053 COMPREHEN METABOLIC PANEL: CPT

## 2024-10-21 PROCEDURE — 36415 COLL VENOUS BLD VENIPUNCTURE: CPT

## 2024-10-21 PROCEDURE — 80061 LIPID PANEL: CPT

## 2024-10-23 ENCOUNTER — TELEPHONE (OUTPATIENT)
Dept: FAMILY MEDICINE CLINIC | Facility: CLINIC | Age: 84
End: 2024-10-23

## 2024-10-23 NOTE — TELEPHONE ENCOUNTER
----- Message from Melvi Dorman sent at 10/23/2024  7:12 AM CDT -----  Results reviewed.   Cholesterol stable but remains mildly elevated. Could consider statin but she has not wanted this in past  Chemistries stable  Thyroid function normal  CEA normal  No anemia

## 2024-10-30 RX ORDER — AMLODIPINE BESYLATE 5 MG/1
TABLET ORAL
Qty: 90 TABLET | Refills: 0 | Status: SHIPPED | OUTPATIENT
Start: 2024-10-30

## 2025-03-07 RX ORDER — AMLODIPINE BESYLATE 5 MG/1
TABLET ORAL
Qty: 90 TABLET | Refills: 0 | Status: SHIPPED | OUTPATIENT
Start: 2025-03-07

## 2025-03-07 NOTE — TELEPHONE ENCOUNTER
LOV: 10/17/24 for physical      AMLODIPINE 5 MG Oral Tab  Take 1 tablet by mouth once daily Dispense: 90 tablet, Refills: 0 ordered        10/30/2024          Hypertension Medications Protocol [id number]/07/2025 11:09 AM   Protocol Details CMP or BMP in past 12 months    Last BP reading less than 140/90    In person appointment or virtual visit in the past 12 mos or appointment in next 3 mos    EGFRCR or GFRNAA > 50    Medication is active on med list     No future appointments.

## 2025-04-21 ENCOUNTER — HOSPITAL ENCOUNTER (OUTPATIENT)
Age: 85
Discharge: HOME | End: 2025-04-21
Payer: MEDICARE

## 2025-04-21 VITALS
BODY MASS INDEX: 20.99 KG/M2 | SYSTOLIC BLOOD PRESSURE: 170 MMHG | HEIGHT: 58 IN | HEART RATE: 88 BPM | WEIGHT: 100 LBS | TEMPERATURE: 98 F | DIASTOLIC BLOOD PRESSURE: 70 MMHG | RESPIRATION RATE: 16 BRPM | OXYGEN SATURATION: 100 %

## 2025-04-21 DIAGNOSIS — L02.91 ABSCESS: Primary | ICD-10-CM

## 2025-04-21 PROCEDURE — 99203 OFFICE O/P NEW LOW 30 MIN: CPT | Performed by: PHYSICIAN ASSISTANT

## 2025-04-21 RX ORDER — CEPHALEXIN 500 MG/1
500 CAPSULE ORAL 4 TIMES DAILY
Qty: 28 CAPSULE | Refills: 0 | Status: SHIPPED | OUTPATIENT
Start: 2025-04-21 | End: 2025-04-28

## 2025-04-21 NOTE — ED PROVIDER NOTES
Patient Seen in: [facility]      History     Chief Complaint   Patient presents with    Cellulitis     Stated Complaint: neck irritation    Subjective:   The history is provided by the patient and a relative.       84-year-old female with past medical history of hypertension, bladder cancer, arthritis presents to the immediate care due to a red raised area on the base of her neck for the past several days.  Is known to always have a \" bump\" in the area.  Most likely some kind of cyst that she had seen her primary care doctor for in the past.  Never seem to bother her until recently.  Area was extremely itchy.  Now with some swelling and redness.  Minimal pain.  No drainage.  No fevers.  No history of skin infections.  Has been using hydrocortisone on the area to help with itching.  History of Present Illness               Objective:     Past Medical History:    Arthritis    Bladder cancer (HCC)    Colon cancer (HCC)    Essential hypertension    Malignant neoplasm of urinary bladder (HCC)    2012, 2013, 2014, 2019 (treated with bcg bladder infusions)-urologist Dr. Meng Stephens [place]              Past Surgical History:   Procedure Laterality Date    Hernia surgery      Other surgical history      colectomy                Social History     Socioeconomic History    Marital status:    Tobacco Use    Smoking status: Never    Smokeless tobacco: Never   Vaping Use    Vaping status: Never Used   Substance and Sexual Activity    Alcohol use: Never    Drug use: Never     Social Drivers of Health      Received from [facility]    Housing Stability              Review of Systems   Constitutional: Negative.    Respiratory: Negative.     Cardiovascular: Negative.    Skin:         abscess       Positive for stated complaint: neck irritation  Other systems are as noted in HPI.  Constitutional and vital signs reviewed.      All other systems reviewed and negative except as noted  above.                  Physical Exam     ED Triage Vitals [04/21/25 1528]   BP (!) 170/70   Pulse 88   Resp 16   Temp 98.1 °F (36.7 °C)   Temp src Oral   SpO2 100 %   O2 Device None (Room air)       Current Vitals:   Vital Signs  BP: (!) 170/70 (RN aware, and vitals taken sondra after 185/85 automatic.)  Pulse: 88  Resp: 16  Temp: 98.1 °F (36.7 °C)  Temp src: Oral    Oxygen Therapy  SpO2: 100 %  O2 Device: None (Room air)        Physical Exam  Vitals and nursing note reviewed.   Constitutional:       General: She is not in acute distress.  Pulmonary:      Effort: Pulmonary effort is normal.   Skin:     Comments: 3x3cm fluctuant abscess at the base of the neck. No surrounding erythema. Nontender,    Neurological:      General: No focal deficit present.      Mental Status: She is alert and oriented to person, place, and time.   Psychiatric:         Mood and Affect: Mood normal.         Behavior: Behavior normal.           Physical Exam                ED Course   Labs Reviewed - No data to display       Results                                 MDM   Ddx-sebaceous cyst, sebaceous cyst with infection, abscess    On exam the patient is afebrile nontoxic her vital signs are stable.  3 x 3 cm fluctuant abscess is noted at the base of the neck.  Overlying erythema no surrounding cellulitis.  Nontender.  Offered incision and drainage however patient declined.  Would prefer oral antibiotics as a trial first.  Will start on Keflex dermatology follow-up was given.  Reassured patient she can return to at any time to the immediate care for incision and drainage. Strict return precautions were discussed        Medical Decision Making  Problems Addressed:  Abscess: acute illness or injury    Risk  OTC drugs.  Prescription drug management.        Disposition and Plan     Clinical Impression:  1. Abscess         Disposition:  Discharge  4/21/2025  4:12 pm    Follow-up:  Sylwia Longoria MD  [address]  [id number]  [phone]          [facility] [address]  [facility] [address] [phone]              Medications Prescribed:  Discharge Medication List as of 4/21/2025  4:14 PM        START taking these medications    Details   cephALEXin 500 MG Oral Cap Take 1 capsule (500 mg total) by mouth 4 (four) times daily for 7 days., Normal, Disp-28 capsule, R-0             Supplementary Documentation:

## 2025-04-21 NOTE — DISCHARGE INSTRUCTIONS
Can attempt moist warm compresses  Keflex as directed until gone  Can use zyrtec as needed for itching  Follow-up with dermatology  If you change your mind about incision and drainage please report back to the immediate care.

## 2025-04-21 NOTE — ED INITIAL ASSESSMENT (HPI)
Pt with bump to the back of the neck. Redness noted. Started about 3 days ago. Was itchy and was using sponge in shower it wash and itch.

## 2025-04-28 ENCOUNTER — HOSPITAL ENCOUNTER (OUTPATIENT)
Age: 85
Discharge: HOME | End: 2025-04-28
Payer: MEDICARE

## 2025-04-28 VITALS
BODY MASS INDEX: 21 KG/M2 | WEIGHT: 100 LBS | OXYGEN SATURATION: 98 % | RESPIRATION RATE: 20 BRPM | HEART RATE: 110 BPM | TEMPERATURE: 98 F | SYSTOLIC BLOOD PRESSURE: 175 MMHG | DIASTOLIC BLOOD PRESSURE: 85 MMHG

## 2025-04-28 DIAGNOSIS — M43.6 TORTICOLLIS: ICD-10-CM

## 2025-04-28 DIAGNOSIS — L08.9 INFECTED CYST OF SKIN: Primary | ICD-10-CM

## 2025-04-28 DIAGNOSIS — L72.9 INFECTED CYST OF SKIN: Primary | ICD-10-CM

## 2025-04-28 PROCEDURE — 99214 OFFICE O/P EST MOD 30 MIN: CPT | Performed by: NURSE PRACTITIONER

## 2025-04-28 RX ORDER — CEPHALEXIN 500 MG/1
500 CAPSULE ORAL 4 TIMES DAILY
Qty: 12 CAPSULE | Refills: 0 | Status: SHIPPED | OUTPATIENT
Start: 2025-04-28 | End: 2025-05-01

## 2025-04-28 RX ORDER — ORPHENADRINE CITRATE 100 MG/1
100 TABLET ORAL 2 TIMES DAILY
Qty: 10 EACH | Refills: 0 | Status: SHIPPED | OUTPATIENT
Start: 2025-04-28 | End: 2025-05-03

## 2025-04-28 NOTE — ED INITIAL ASSESSMENT (HPI)
Patient has almost finished (1 pill remaining) of Keflex. She had a lot of redness, that is now diminished, but there is still a bump with white head to the back of the patient's distal neck/upper back. The bump is affecting her sleep and she now has muscle tightness to her the right shoulder area that is painful. She needs to schedule follow-up with derm still.

## 2025-04-28 NOTE — ED PROVIDER NOTES
Patient Seen in: [facility]      History     Chief Complaint   Patient presents with    Wound Recheck     Stated Complaint: Bump behind the neck; Right side of the neck is now stiff; Pain has moved    Subjective:   83 yo female presents to the immediate care with c/o neck pain.  Patient was seen here one week ago and treated with antibiotics for an infected cyst to the left posterior neck.  She has almost completed the antibiotics and wants to get it checked out.  She also complains of right sided neck pain and stiffness over the last 2-3 days.  She has been taking Aleve for this, but today it didn't help.  She states that she has not been able to sleep normally because of the cyst to her neck.  She has one pill of her antibiotics left, but the cyst continues to be large.  She denies any fever, chills, numbness, or tingling.     The history is provided by the patient.       History of Present Illness               Objective:     Past Medical History:    Arthritis    Bladder cancer (HCC)    Colon cancer (HCC)    Essential hypertension    Malignant neoplasm of urinary bladder (HCC)    2012, 2013, 2014, 2019 (treated with bcg bladder infusions)-urologist Dr. Meng Stephens [place]              Past Surgical History:   Procedure Laterality Date    Hernia surgery      Other surgical history      colectomy                Social History     Socioeconomic History    Marital status:    Tobacco Use    Smoking status: Never    Smokeless tobacco: Never   Vaping Use    Vaping status: Never Used   Substance and Sexual Activity    Alcohol use: Never    Drug use: Never     Social Drivers of Health      Received from [facility]    Housing Stability              Review of Systems   Constitutional: Negative.    Musculoskeletal:  Positive for myalgias and neck pain.   Skin:  Positive for wound.   All other systems reviewed and are negative.      Positive for stated complaint: Bump behind  the neck; Right side of the neck is now stiff; Pain has moved  Other systems are as noted in HPI.  Constitutional and vital signs reviewed.      All other systems reviewed and negative except as noted above.                  Physical Exam     ED Triage Vitals [04/28/25 1432]   BP (!) 175/85   Pulse 110   Resp 20   Temp 98.1 °F (36.7 °C)   Temp src Oral   SpO2 98 %   O2 Device None (Room air)       Current Vitals:   Vital Signs  BP: (!) 175/85  Pulse: 110  Resp: 20  Temp: 98.1 °F (36.7 °C)  Temp src: Oral    Oxygen Therapy  SpO2: 98 %  O2 Device: None (Room air)        Physical Exam  Vitals and nursing note reviewed.   Constitutional:       General: She is not in acute distress.     Appearance: Normal appearance. She is normal weight. She is not ill-appearing.   HENT:      Head: Normocephalic and atraumatic.      Nose: Nose normal.      Mouth/Throat:      Mouth: Mucous membranes are moist.      Pharynx: Oropharynx is clear.   Eyes:      Conjunctiva/sclera: Conjunctivae normal.      Pupils: Pupils are equal, round, and reactive to light.   Neck:      Comments: Tenderness with palpation of the right cervical paraspinal muscles. There is moderate spasm present.  ROM is limited with rotation due to spasm.  No vertebral point tenderness, crepitus, or step offs.  Motor strength and sensation intact.  Hand grasps are strong and equal bilaterally.    Cardiovascular:      Rate and Rhythm: Normal rate and regular rhythm.      Pulses: Normal pulses.      Heart sounds: Normal heart sounds.   Pulmonary:      Effort: Pulmonary effort is normal. No respiratory distress.      Breath sounds: Normal breath sounds.   Musculoskeletal:         General: Normal range of motion.      Cervical back: Normal range of motion and neck supple. Torticollis and tenderness present. Muscular tenderness present. No spinous process tenderness.   Skin:     General: Skin is warm and dry.      Comments: Large fluctuant cyst to the left posterior neck.   There is some fading ecchymosis and erythema to the area.  Area of eminent rupture is present.  With small amount of pressure, exudates were expressed.  Using manual expression a large amount of drainage and old cellular debris was removed.  Patient tolerated procedure well.     Neurological:      General: No focal deficit present.      Mental Status: She is alert and oriented to person, place, and time.   Psychiatric:         Mood and Affect: Mood normal.         Behavior: Behavior normal.         Physical Exam                ED Course   Labs Reviewed - No data to display     Results             MDM        Medical Decision Making  85 yo female with large cyst and torticollis.  Cyst was drained with manual expression.  Dressed by RN with gauze.      History and exam consistent with torticollis.  Do not feel that any imaging is necessary at this time.  Will plan to treat patient medically.  No evidence of fracture, acute cord compression, or radiculopathy.  Patient can take Ibuprofen and/or Tylenol for pain.  A muscle relaxer was prescribed for stiffness and soreness.  Discussed with patient caution with using the muscle relaxer as the sedative properties could put  her at a higher risk for falls.  Patient verbalized understanding and agreement.  Supportive management at home.  Follow up with PCP in 1 week as needed.    Risk  OTC drugs.  Prescription drug management.        Disposition and Plan     Clinical Impression:  1. Infected cyst of skin    2. Torticollis         Disposition:  Discharge  4/28/2025  3:23 pm    Follow-up:  Sylwia Longoria MD  [address] [id number] [phone]                Medications Prescribed:  Current Discharge Medication List        START taking these medications    Details   orphenadrine  MG Oral Tablet 12 Hr Take 100 mg by mouth 2 (two) times daily for 5 days.  Qty: 10 each, Refills: 0      !! cephALEXin 500 MG Oral Cap Take 1 capsule (500 mg total) by mouth 4  (four) times daily for 3 days.  Qty: 12 capsule, Refills: 0       !! - Potential duplicate medications found. Please discuss with provider.          Supplementary Documentation:

## 2025-04-28 NOTE — DISCHARGE INSTRUCTIONS
Rest and drink plenty of fluids.   Apply ice 20 minutes on and then 40 minutes off several times a day.  Alternate ice with warm, moist compresses.   Take Tylenol and/or ibuprofen as needed for pain.   Use the muscle relaxer for muscle stiffness or soreness.   After the acute pain improves, start with light stretching or yoga to help prevent further injury.   Continue the antibiotics as prescribed.  Continue to use warm compresses over the cyst.  Follow up with your PCP in 3-5 days.     Thank you for choosing [facility] for your care.

## 2025-04-30 ENCOUNTER — TELEPHONE (OUTPATIENT)
Dept: FAMILY MEDICINE CLINIC | Facility: CLINIC | Age: 85
End: 2025-04-30

## 2025-04-30 NOTE — TELEPHONE ENCOUNTER
Daughter called in - patient had appointment tomorrow with MAGGIE Aguilar for IC f/u with cyst - needed a dermatologist.    Patient is still having \"a lot of pain in neck and back\", daughter was wondering is she could have stronger pain medication.  Patient was seen yesterday in the IC for wound recheck/torticollis.  Initially seen on 4/21/2025 for cyst.    Discussed with MAGGIE Aguilar - patient should be taken to an ER for re-evaluation/treatment.    Daughter will try to get patient to go.

## 2025-05-01 ENCOUNTER — OFFICE VISIT (OUTPATIENT)
Dept: FAMILY MEDICINE CLINIC | Facility: CLINIC | Age: 85
End: 2025-05-01
Payer: MEDICARE

## 2025-05-01 VITALS
DIASTOLIC BLOOD PRESSURE: 70 MMHG | HEART RATE: 63 BPM | BODY MASS INDEX: 22.25 KG/M2 | SYSTOLIC BLOOD PRESSURE: 138 MMHG | HEIGHT: 58 IN | TEMPERATURE: 97 F | OXYGEN SATURATION: 100 % | WEIGHT: 106 LBS

## 2025-05-01 DIAGNOSIS — L08.9 INFECTED SEBACEOUS CYST: Primary | ICD-10-CM

## 2025-05-01 DIAGNOSIS — L72.3 INFECTED SEBACEOUS CYST: Primary | ICD-10-CM

## 2025-05-01 PROCEDURE — 99213 OFFICE O/P EST LOW 20 MIN: CPT | Performed by: NURSE PRACTITIONER

## 2025-05-01 RX ORDER — TRAMADOL HYDROCHLORIDE 50 MG/1
50 TABLET ORAL
COMMUNITY
Start: 2025-04-30 | End: 2025-05-05

## 2025-05-01 NOTE — PROGRESS NOTES
HPI:     Patient is here with daughter for Immediate Care and ER follow up. Has large cyst on her upper back. Has been drained twice. Feels much better. She is taking an antibiotic. Denies fevers or chills. Has not removed the bandage from ER visit yesterday. Needs referral to see derm. Would like to see same derm as her daughter.     Current Medications[1]   Past Medical History[2]   Past Surgical History[3]   Family History[4]   Short Social Hx on File[5]      REVIEW OF SYSTEMS:   Review of Systems   Constitutional:  Negative for chills, fatigue and fever.   Musculoskeletal:  Positive for back pain (chronic) and neck stiffness (not related to cyst though).   Skin:  Positive for wound.   Neurological:  Negative for dizziness.       EXAM:   /70   Pulse 63   Temp 97.4 °F (36.3 °C)   Ht 4' 10\" (1.473 m)   Wt 106 lb (48.1 kg)   SpO2 100%   BMI 22.15 kg/m²   Physical Exam  Constitutional:       General: She is not in acute distress.     Appearance: She is normal weight.   Neurological:      General: No focal deficit present.      Mental Status: She is alert.         ASSESSMENT AND PLAN:   Diagnoses and all orders for this visit:    Infected sebaceous cyst  -     Derm Referral - External  -     Surgery Referral - In Network    Keep area clean and dry  Referral placed for derm. Also placed referral for surgeon. Either could handle removal of cyst  Complete antibiotic as prescribed     Note to patient: The 21st Century Cures Act makes medical notes like these available to patients in the interest of transparency. However, be advised this is a medical document. It is intended as peer to peer communication. It is written in medical language and may contain abbreviations or verbiage that are unfamiliar. It may appear blunt or direct. Medical documents are intended to carry relevant information, facts as evident, and the clinical opinion of the practitioner.           [1]   Current Outpatient Medications    Medication Sig Dispense Refill    traMADol 50 MG Oral Tab Take 1 tablet (50 mg total) by mouth.      orphenadrine  MG Oral Tablet 12 Hr Take 100 mg by mouth 2 (two) times daily for 5 days. 10 each 0    cephALEXin 500 MG Oral Cap Take 1 capsule (500 mg total) by mouth 4 (four) times daily for 3 days. 12 capsule 0    AMLODIPINE 5 MG Oral Tab Take 1 tablet by mouth once daily 90 tablet 0    chlorhexidine gluconate 0.12 % Mouth/Throat Solution       Turmeric (QC TUMERIC COMPLEX OR) Take by mouth.      Multiple Vitamin (MULTI-VITAMIN) Oral Tab Take 1 tablet by mouth every morning.      Cyanocobalamin (VITAMIN B-12) 2500 MCG Sublingual SL Tab Take 500 mcg by mouth every morning.      LUTEIN OR Take 1 tablet by mouth every morning.      Cranberry 1000 MG Oral Cap Take 1 tablet by mouth every morning.     [2]   Past Medical History:   Arthritis    Bladder cancer (HCC)    Colon cancer (HCC)    Essential hypertension    Malignant neoplasm of urinary bladder (HCC)    2012, 2013, 2014, 2019 (treated with bcg bladder infusions)-urologist Dr. Meng [place]   [3]   Past Surgical History:  Procedure Laterality Date    Hernia surgery      Other surgical history      colectomy   [4] No family history on file.  [5]   Social History  Socioeconomic History    Marital status:    Tobacco Use    Smoking status: Never    Smokeless tobacco: Never   Vaping Use    Vaping status: Never Used   Substance and Sexual Activity    Alcohol use: Never    Drug use: Never     Social Drivers of Health      Received from [facility]    Housing Stability

## 2025-05-22 ENCOUNTER — APPOINTMENT (OUTPATIENT)
Dept: DERMATOLOGY | Age: 85
End: 2025-05-22

## 2025-05-22 DIAGNOSIS — L57.9 SKIN CHANGES DUE TO CHRONIC EXPOSURE TO NONIONIZING RADIATION: ICD-10-CM

## 2025-05-22 DIAGNOSIS — L72.0 EIC (EPIDERMAL INCLUSION CYST): Primary | ICD-10-CM

## 2025-05-22 RX ORDER — CEPHALEXIN 500 MG/1
TABLET, FILM COATED ORAL
Qty: 20 TABLET | Refills: 0 | Status: SHIPPED | OUTPATIENT
Start: 2025-05-22

## 2025-06-10 ENCOUNTER — APPOINTMENT (OUTPATIENT)
Dept: DERMATOLOGY | Age: 85
End: 2025-06-10

## 2025-06-10 DIAGNOSIS — D48.5 NEOPLASM OF UNCERTAIN BEHAVIOR OF SKIN: Primary | ICD-10-CM

## 2025-06-10 DIAGNOSIS — L82.1 SEBORRHEIC KERATOSIS: ICD-10-CM

## 2025-06-10 DIAGNOSIS — L57.9 SKIN CHANGES DUE TO CHRONIC EXPOSURE TO NONIONIZING RADIATION: ICD-10-CM

## 2025-06-10 PROCEDURE — 99213 OFFICE O/P EST LOW 20 MIN: CPT | Performed by: DERMATOLOGY

## 2025-06-10 RX ORDER — AMLODIPINE BESYLATE 5 MG/1
5 TABLET ORAL DAILY
COMMUNITY
Start: 2025-03-07

## 2025-06-17 ENCOUNTER — RESULTS FOLLOW-UP (OUTPATIENT)
Dept: DERMATOLOGY | Age: 85
End: 2025-06-17

## 2025-06-17 LAB
ASR DISCLAIMER: NORMAL
CASE RPRT: NORMAL
CLINICAL INFO: NORMAL
PATH REPORT.FINAL DX SPEC: NORMAL
PATH REPORT.GROSS SPEC: NORMAL

## 2025-06-17 RX ORDER — AMLODIPINE BESYLATE 5 MG/1
5 TABLET ORAL DAILY
Qty: 90 TABLET | Refills: 0 | Status: SHIPPED | OUTPATIENT
Start: 2025-06-17

## 2025-11-18 ENCOUNTER — APPOINTMENT (OUTPATIENT)
Dept: DERMATOLOGY | Age: 85
End: 2025-11-18

## (undated) NOTE — LETTER
09/21/20          Suman Astudillo   [address] [facility]           Dear Donna Leiva records indicate that you have outstanding lab work and or testing that was ordered for you and has not yet been completed:  Lab Frequency Next